# Patient Record
Sex: FEMALE | Race: WHITE
[De-identification: names, ages, dates, MRNs, and addresses within clinical notes are randomized per-mention and may not be internally consistent; named-entity substitution may affect disease eponyms.]

---

## 2017-01-16 ENCOUNTER — HOSPITAL ENCOUNTER (OUTPATIENT)
Dept: HOSPITAL 75 - ONC | Age: 67
LOS: 90 days | Discharge: HOME | End: 2017-04-16
Attending: INTERNAL MEDICINE
Payer: MEDICARE

## 2017-01-16 DIAGNOSIS — Z92.21: ICD-10-CM

## 2017-01-16 DIAGNOSIS — Z87.891: ICD-10-CM

## 2017-01-16 DIAGNOSIS — C32.1: Primary | ICD-10-CM

## 2017-01-16 DIAGNOSIS — Z92.3: ICD-10-CM

## 2017-01-16 LAB
ALBUMIN SERPL-MCNC: 4.1 G/DL (ref 3.2–4.5)
ALT SERPL-CCNC: 14 U/L (ref 0–55)
ANION GAP SERPL CALC-SCNC: 8 MMOL/L (ref 5–14)
AST SERPL-CCNC: 13 U/L (ref 5–34)
BASOPHILS # BLD AUTO: 0 10^3/UL (ref 0–0.1)
BASOPHILS NFR BLD AUTO: 0 % (ref 0–10)
BILIRUB SERPL-MCNC: 0.4 MG/DL (ref 0.1–1)
BUN SERPL-MCNC: 14 MG/DL (ref 7–18)
BUN/CREAT SERPL: 17
CALCIUM SERPL-MCNC: 9.3 MG/DL (ref 8.5–10.1)
CHLORIDE SERPL-SCNC: 104 MMOL/L (ref 98–107)
CO2 SERPL-SCNC: 27 MMOL/L (ref 21–32)
CREAT SERPL-MCNC: 0.83 MG/DL (ref 0.6–1.3)
EOSINOPHIL # BLD AUTO: 0.1 10^3/UL (ref 0–0.3)
EOSINOPHIL NFR BLD AUTO: 2 % (ref 0–10)
ERYTHROCYTE [DISTWIDTH] IN BLOOD BY AUTOMATED COUNT: 13.9 % (ref 10–14.5)
GFR SERPLBLD BASED ON 1.73 SQ M-ARVRAT: > 60 ML/MIN
GLUCOSE SERPL-MCNC: 121 MG/DL (ref 70–105)
LYMPHOCYTES # BLD AUTO: 1.2 X 10^3 (ref 1–4)
LYMPHOCYTES NFR BLD AUTO: 19 % (ref 12–44)
MCH RBC QN AUTO: 30 PG (ref 25–34)
MCHC RBC AUTO-ENTMCNC: 33 G/DL (ref 32–36)
MCV RBC AUTO: 91 FL (ref 80–99)
MONOCYTES # BLD AUTO: 0.6 X 10^3 (ref 0–1)
MONOCYTES NFR BLD AUTO: 9 % (ref 0–12)
NEUTROPHILS # BLD AUTO: 4.4 X 10^3 (ref 1.8–7.8)
NEUTROPHILS NFR BLD AUTO: 70 % (ref 42–75)
PLATELET # BLD: 228 10^3/UL (ref 130–400)
PMV BLD AUTO: 9 FL (ref 7.4–10.4)
POTASSIUM SERPL-SCNC: 4.5 MMOL/L (ref 3.6–5)
PROT SERPL-MCNC: 7 G/DL (ref 6.4–8.2)
RBC # BLD AUTO: 4.25 10^6/UL (ref 4.35–5.85)
SODIUM SERPL-SCNC: 139 MMOL/L (ref 135–145)
TSH SERPL-ACNC: 2.4 UIU/ML (ref 0.35–4.94)
WBC # BLD AUTO: 6.2 10^3/UL (ref 4.3–11)

## 2017-01-16 PROCEDURE — 85025 COMPLETE CBC W/AUTO DIFF WBC: CPT

## 2017-01-16 PROCEDURE — 99213 OFFICE O/P EST LOW 20 MIN: CPT

## 2017-01-16 PROCEDURE — 36415 COLL VENOUS BLD VENIPUNCTURE: CPT

## 2017-01-16 PROCEDURE — 80053 COMPREHEN METABOLIC PANEL: CPT

## 2017-01-16 PROCEDURE — 84443 ASSAY THYROID STIM HORMONE: CPT

## 2017-01-16 NOTE — XMS REPORT
Continuity of Care Document

 Created on: 2014



YARITZA HERZOG

External Reference #: [id number]

: 1950

Sex: Female



Demographics







 Address  [address]

[place]  [address]

 

 Home Phone  [phone]

 

 Preferred Language  English

 

 Marital Status  Unknown

 

 Rastafari Affiliation  Unknown

 

 Race  Unknown

 

 Ethnic Group  Unknown





Author







 Author  [facility]

 

 Organization  [facility]

 

 Address  Unknown

 

 Phone  Unavailable







Support







 Name  Relationship  Address  Phone

 

 BREANA AMARAL MD  Caregiver  [address], [address]

[place]  [address] [phone]

 

 SHARON STEVENS MD  Caregiver  [address] [place]  [address] [phone]

 

 MANDO MARIE  Caregiver  [address] [place], [place]  [address] [phone]

 

 LORENA [place]  [address] [phone]







Care Team Providers







 Care Team Member Name  Role  Phone

 

 BREANA AMARAL MD  PCP  [phone]







Insurance Providers







 Payer Name  Policy Number  Subscriber Name  Relationship

 

 Self Pay     [employer]  18 Self / Same As Patient







Advance Directives







 Directive  Response  Recorded Date/Time

 

 Advance Directives  Yes  14 8:30am

 

 Health Care Power of   No  14 8:30am

 

 Organ Donor  Yes  14 8:30am

 

 Resuscitation Status  Full Code  14 8:30am







Problems

Medical Problems





 Problem  Onset Date  Status

 

 Fecal impaction  Unknown  Active







Medications







 Medication  Dose  Route  Sig  Days/Qty  Instructions  Order Date  Discontinued 
Date  Status

 

 HCTZ/Lisinopril (Zestoretic)  1 Each  PO  DAILY        14     Active

 

 Tramadol Hcl  50 Mg  PO  THREE TIMES A DAY For Pain  30 Qty     10/02/14  11/14
/14  Discontinued

 

 Acetaminophen  325 Mg  PO  AS NEEDED        14     Active

 

 Potassium Chloride (Micro K)  1 Each  PO  DAILY WITH FOOD        14     
Active

 

 Ondansetron Hcl  8 Mg  PO  EVERY 8HRS PRN NAUSEA/VOMITING        14     
Active

 

 Omeprazole  20 Mg  GT  DAILY        14     Active

 

 Docusate Sodium  100 Mg  PO  TWICE A DAY        14     Active

 

 Polyethylene Glycol  17 Gm  GT           14     Active

 

 [Gi Cocktail]        AS DIRECTED        14     Active

 

 Pantoprazole Sodium  40 Mg  PO  DAILY  90 Qty     14     Active







Social History







 Social History Problem  Response  Recorded Date/Time

 

 Alcohol Use  Rarely Uses  2014 5:12am

 

 Recreational Drug Use  No  2014 5:12am

 

 Recent Foreign Travel  No  2014 8:20am

 

 Smoking Status  Former Smoker  2014 8:15am

 

 Do you dip or chew tobacco?  No  2014 8:15am









 Query  Response  Start Date  Stop Date

 

 Smoking Status  Former Smoker     09/10/2014







Hospital Discharge Instructions

No hospital discharge instructions.



Plan of Care

No plan of care.



Functional Status

No functional status results.



Allergies, Adverse Reactions, Alerts







 Allergen  Type  Severity  Reaction  Status  Last Updated

 

 Codeine  Allergy  Unknown  NAUSEA  Active  14







Immunizations

No immunization records.



Vital Signs

Acute Vital Signs





 Vital  Response  Date/Time

 

 Temperature (Fahrenheit)  97.3 degrees F (97.6 - 99.5)   

 

 Temperature (Calculated Celsius)  36.59158 degrees C (36.4 - 37.5)   

 

 Temperature Source  Tympanic     

 

 Pulse Rate (adult)  81 bpm (60 - 90)   

 

 Respiratory Rate  16 bpm (12 - 24)   

 

 O2 Sat by Pulse Oximetry  98 % (88 - 100)   

 

 Blood Pressure  124/69 mm Hg   

 

 Pain      

 

    Pain Intensity  5     

 

 Height (Feet)  5 feet    

 

 Height (Inches)  6.00 inches    

 

 Height (Calculated Centimeters)  167.917173 cm    

 

 Weight (Pounds)  171 pounds    

 

 Weight (Calculated Grams)  58028.296 gm    

 

 Weight (Calculated Kilograms)  77.905235 kilograms    

 

 Calculated BMI  21.09     







Results

Laboratory Results







 Test Name  Result  Units  Flags  Reference  Collection Date/Time  Result Date/
Time  Comments

 

 White Blood Count  9.0  10^3/uL     4.3-11.0  2014 2:41pm  2014 2:
49pm   

 

 Red Blood Count  4.01  10^6/uL  L  4.35-5.85  2014 2:41pm  2014 2:
49pm   

 

 Hemoglobin  12.8  G/DL     11.5-16.0  2014 2:41pm  2014 2:49pm   

 

 Hematocrit  38  %     35-52  2014 2:41pm  2014 2:49pm   

 

 Mean Corpuscular Volume  94  FL     80-99  2014 2:41pm  2014 2:
49pm   

 

 Mean Corpuscular Hemoglobin  32  PG     25-34  2014 2:41pm  2014 2:
49pm   

 

 Mean Corpuscular Hemoglobin Concent  34  G/DL     32-36  2014 2:41pm   2:49pm   

 

 Red Cell Distribution Width  13.2  %     10.0-14.5  2014 2:41pm  2014 2:49pm   

 

 Platelet Count  186  10^3/uL     130-400  2014 2:41pm  2014 2:49pm
   

 

 Mean Platelet Volume  9.3  FL     7.4-10.4  2014 2:41pm  2014 2:
49pm   

 

 Neutrophils (%) (Auto)  74  %     42-75  2014 2:41pm  2014 2:49pm 
  

 

 Lymphocytes (%) (Auto)  18  %     12-44  2014 2:41pm  2014 2:49pm 
  

 

 Monocytes (%) (Auto)  7  %     0-12  2014 2:41pm  2014 2:49pm   

 

 Eosinophils (%) (Auto)  1  %     0-10  2014 2:41pm  2014 2:49pm   

 

 Basophils (%) (Auto)  0  %     0-10  2014 2:41pm  2014 2:49pm   

 

 Neutrophils # (Auto)  6.7  X 10^3     1.8-7.8  2014 2:41pm  2014 2:
49pm   

 

 Lymphocytes # (Auto)  1.6  X 10^3     1.0-4.0  2014 2:41pm  2014 2:
49pm   

 

 Monocytes # (Auto)  0.6  X 10^3     0.0-1.0  2014 2:41pm  2014 2:
49pm   

 

 Eosinophils # (Auto)  0.1  10^3/uL     0.0-0.3  2014 2:41pm  2014 2
:49pm   

 

 Basophils # (Auto)  0.0  10^3/uL     0.0-0.1  2014 2:41pm  2014 2:
49pm   

 

 Sodium Level  137  MMOL/L     135-145  2014 2:41pm  2014 3:10pm   

 

 Potassium Level  3.9  MMOL/L     3.6-5.0  2014 2:41pm  2014 3:10pm
   

 

 Chloride Level  102  MMOL/L       2014 2:41pm  2014 3:10pm   

 

 Carbon Dioxide Level  23  MMOL/L     21-32  2014 2:41pm  2014 3:
10pm   

 

 Blood Urea Nitrogen  17  MG/DL     7-18  2014 2:41pm  2014 3:10pm 
  

 

 Creatinine  0.75  MG/DL     0.60-1.30  2014 2:41pm  2014 3:10pm   

 

 BUN/Creatinine Ratio  23           2014 2:41pm  2014 3:10pm   

 

 Estimat Glomerular Filtration Rate  > 60           2014 2:41pm  2014 3:10pm                    GFR INTERPRETIVE DATA



         UNITS FOR ESTIMATED GFR (eGFR): mL/min/1.73 M2

         REFERENCE RANGE FOR ESTIMATED GFR (eGFR)

                                          eGFR

         NORMAL eGFR                       >60

         MODERATELY DECREASED eGFR          30-59

         SEVERLY DECREASED eGFR             15-29

         KIDNEY FAILURE                    <15 (OR DIALYSIS)

 

 Glucose Level  143  MG/DL  H    2014 2:41pm  2014 3:10pm   

 

 Calcium Level  8.9  MG/DL     8.5-10.1  2014 2:41pm  2014 3:10pm   

 

 Magnesium Level  2.1  MG/DL     1.8-2.4  2014 2:41pm  2014 3:10pm 
  

 

 Total Bilirubin  0.2  MG/DL     0.1-1.0  2014 2:41pm  2014 3:10pm 
  

 

 Alkaline Phosphatase  84  U/L       2014 2:41pm  2014 3:10pm
   

 

 Aspartate Amino Transf (AST/SGOT)  11  U/L     5-34  2014 2:41pm  2014 3:10pm   

 

 Alanine Aminotransferase (ALT/SGPT)  17  U/L     0-55  2014 2:41pm   3:10pm   

 

 Total Protein  7.2  G/DL     6.4-8.2  2014 2:41pm  2014 3:10pm   

 

 Albumin  4.1  G/DL     3.2-4.5  2014 2:41pm  2014 3:10pm   

 

 White Blood Count  8.8  10^3/uL     4.3-11.0  10/22/2014 12:10pm  10/22/2014 12
:21pm   

 

 Red Blood Count  4.48  10^6/uL     4.35-5.85  10/22/2014 12:10pm  10/22/2014 12
:21pm   

 

 Hemoglobin  14.0  G/DL     11.5-16.0  10/22/2014 12:10pm  10/22/2014 12:21pm   

 

 Hematocrit  42  %     35-52  10/22/2014 12:10pm  10/22/2014 12:21pm   

 

 Mean Corpuscular Volume  94  FL     80-99  10/22/2014 12:10pm  10/22/2014 12:
21pm   

 

 Mean Corpuscular Hemoglobin  31  PG     25-34  10/22/2014 12:10pm  10/22/2014 
12:21pm   

 

 Mean Corpuscular Hemoglobin Concent  33  G/DL     32-36  10/22/2014 12:10pm  10
/ 12:21pm   

 

 Red Cell Distribution Width  13.2  %     10.0-14.5  10/22/2014 12:10pm  10/22/
2014 12:21pm   

 

 Platelet Count  236  10^3/uL     130-400  10/22/2014 12:10pm  10/22/2014 12:
21pm   

 

 Mean Platelet Volume  9.6  FL     7.4-10.4  10/22/2014 12:10pm  10/22/2014 12:
21pm   

 

 Neutrophils (%) (Auto)  61  %     42-75  10/22/2014 12:10pm  10/22/2014 12:
21pm   

 

 Lymphocytes (%) (Auto)  30  %     12-44  10/22/2014 12:10pm  10/22/2014 12:
21pm   

 

 Monocytes (%) (Auto)  7  %     0-12  10/22/2014 12:10pm  10/22/2014 12:21pm   

 

 Eosinophils (%) (Auto)  1  %     0-10  10/22/2014 12:10pm  10/22/2014 12:21pm 
  

 

 Basophils (%) (Auto)  1  %     0-10  10/22/2014 12:10pm  10/22/2014 12:21pm   

 

 Neutrophils # (Auto)  5.4  X 10^3     1.8-7.8  10/22/2014 12:10pm  10/22/2014 
12:21pm   

 

 Lymphocytes # (Auto)  2.7  X 10^3     1.0-4.0  10/22/2014 12:10pm  10/22/2014 
12:21pm   

 

 Monocytes # (Auto)  0.6  X 10^3     0.0-1.0  10/22/2014 12:10pm  10/22/2014 12:
21pm   

 

 Eosinophils # (Auto)  0.1  10^3/uL     0.0-0.3  10/22/2014 12:10pm  10/22/2014 
12:21pm   

 

 Basophils # (Auto)  0.1  10^3/uL     0.0-0.1  10/22/2014 12:10pm  10/22/2014 12
:21pm   

 

 Prothrombin Time  12.9  SEC     12.2-14.7  10/22/2014 12:10pm  10/22/2014 1:
02pm   

 

 INR Comment  1.0        0.8-1.4  10/22/2014 12:10pm  10/22/2014 1:02pm        
               INTERPRETIVE DATA

SUGGESTED THERAPEUTIC RANGE FOR INR'S:

   VENOUS THROMBOSIS, PULMONARY EMBOLISM, OR PREVENTION OF

   SYSTEMIC EMBOLISM (EG. IN ATRIAL FIBRILLATION):

                     2.0  -  3.0

   MECHANICAL PROSTHETIC HEART VALVES:

                     2.5  -  3.5*

*NOTE:  INR'S UP TO 4.5 MAY BE NECESSARY IN SELECTED GROUPS 

        OF HIGH RISK PATIENTS.

[employer] CONSENSUS

CONFERENCE ON ANTITHROMBOTIC THERAPY (2000).

 

 Activated Partial Thromboplast Time  26  SEC     24-35  10/22/2014 12:10pm  10/
 1:02pm   

 

 White Blood Count  9.3  10^3/uL     4.3-11.0  10/31/2014 1:49pm  10/31/2014 1:
58pm   

 

 Red Blood Count  4.41  10^6/uL     4.35-5.85  10/31/2014 1:49pm  10/31/2014 1:
58pm   

 

 Hemoglobin  14.0  G/DL     11.5-16.0  10/31/2014 1:49pm  10/31/2014 1:58pm   

 

 Hematocrit  41  %     35-52  10/31/2014 1:49pm  10/31/2014 1:58pm   

 

 Mean Corpuscular Volume  93  FL     80-99  10/31/2014 1:49pm  10/31/2014 1:
58pm   

 

 Mean Corpuscular Hemoglobin  32  PG     25-34  10/31/2014 1:49pm  10/31/2014 1:
58pm   

 

 Mean Corpuscular Hemoglobin Concent  34  G/DL     32-36  10/31/2014 1:49pm  10/
 1:58pm   

 

 Red Cell Distribution Width  12.9  %     10.0-14.5  10/31/2014 1:49pm  10/31/
2014 1:58pm   

 

 Platelet Count  230  10^3/uL     130-400  10/31/2014 1:49pm  10/31/2014 1:58pm
   

 

 Mean Platelet Volume  9.4  FL     7.4-10.4  10/31/2014 1:49pm  10/31/2014 1:
58pm   

 

 Neutrophils (%) (Auto)  67  %     42-75  10/31/2014 1:49pm  10/31/2014 1:58pm 
  

 

 Lymphocytes (%) (Auto)  25  %     12-44  10/31/2014 1:49pm  10/31/2014 1:58pm 
  

 

 Monocytes (%) (Auto)  7  %     0-12  10/31/2014 1:49pm  10/31/2014 1:58pm   

 

 Eosinophils (%) (Auto)  1  %     0-10  10/31/2014 1:49pm  10/31/2014 1:58pm   

 

 Basophils (%) (Auto)  0  %     0-10  10/31/2014 1:49pm  10/31/2014 1:58pm   

 

 Neutrophils # (Auto)  6.3  X 10^3     1.8-7.8  10/31/2014 1:49pm  10/31/2014 1:
58pm   

 

 Lymphocytes # (Auto)  2.3  X 10^3     1.0-4.0  10/31/2014 1:49pm  10/31/2014 1:
58pm   

 

 Monocytes # (Auto)  0.7  X 10^3     0.0-1.0  10/31/2014 1:49pm  10/31/2014 1:
58pm   

 

 Eosinophils # (Auto)  0.1  10^3/uL     0.0-0.3  10/31/2014 1:49pm  10/31/2014 1
:58pm   

 

 Basophils # (Auto)  0.0  10^3/uL     0.0-0.1  10/31/2014 1:49pm  10/31/2014 1:
58pm   

 

 Sodium Level  140  MMOL/L     135-145  10/31/2014 1:49pm  10/31/2014 2:15pm   

 

 Potassium Level  3.8  MMOL/L     3.6-5.0  10/31/2014 1:49pm  10/31/2014 2:15pm
   

 

 Chloride Level  105  MMOL/L       10/31/2014 1:49pm  10/31/2014 2:15pm   

 

 Carbon Dioxide Level  22  MMOL/L     21-32  10/31/2014 1:49pm  10/31/2014 2:
15pm   

 

 Blood Urea Nitrogen  13  MG/DL     7-18  10/31/2014 1:49pm  10/31/2014 2:15pm 
  

 

 Creatinine  0.70  MG/DL     0.60-1.30  10/31/2014 1:49pm  10/31/2014 2:15pm   

 

 BUN/Creatinine Ratio  19           10/31/2014 1:49pm  10/31/2014 2:15pm   

 

 Estimat Glomerular Filtration Rate  > 60           10/31/2014 1:49pm  10/31/
2014 2:15pm                    GFR INTERPRETIVE DATA



         UNITS FOR ESTIMATED GFR (eGFR): mL/min/1.73 M2

         REFERENCE RANGE FOR ESTIMATED GFR (eGFR)

                                          eGFR

         NORMAL eGFR                       >60

         MODERATELY DECREASED eGFR          30-59

         SEVERLY DECREASED eGFR             15-29

         KIDNEY FAILURE                    <15 (OR DIALYSIS)

 

 Glucose Level  139  MG/DL  H    10/31/2014 1:49pm  10/31/2014 2:15pm   

 

 Calcium Level  9.3  MG/DL     8.5-10.1  10/31/2014 1:49pm  10/31/2014 2:15pm   

 

 Total Bilirubin  0.3  MG/DL     0.1-1.0  10/31/2014 1:49pm  10/31/2014 2:15pm 
  

 

 Alkaline Phosphatase  86  U/L       10/31/2014 1:49pm  10/31/2014 2:15pm
   

 

 Aspartate Amino Transf (AST/SGOT)  19  U/L     5-34  10/31/2014 1:49pm  10/31/
2014 2:15pm   

 

 Alanine Aminotransferase (ALT/SGPT)  19  U/L     0-55  10/31/2014 1:49pm  10/31
/2014 2:15pm   

 

 Total Protein  7.7  G/DL     6.4-8.2  10/31/2014 1:49pm  10/31/2014 2:15pm   

 

 Albumin  4.4  G/DL     3.2-4.5  10/31/2014 1:49pm  10/31/2014 2:15pm   

 

 White Blood Count  8.7  10^3/uL     4.3-11.0  2014 5:152014 5:
37am   

 

 Red Blood Count  4.46  10^6/uL     4.35-5.85  2014 5:152014 5:
37am   

 

 Hemoglobin  14.3  G/DL     11.5-16.0  2014 5:152014 5:37am   

 

 Hematocrit  42  %     35-52  2014 5:152014 5:37am   

 

 Mean Corpuscular Volume  94  FL     80-99  2014 5:2014 5:
37am   

 

 Mean Corpuscular Hemoglobin  32  PG     25-34  2014 5:152014 5:
37am   

 

 Mean Corpuscular Hemoglobin Concent  34  G/DL     32-36  2014 5:15 5:37am   

 

 Red Cell Distribution Width  13.1  %     10.0-14.5  2014 5:152014 5:37am   

 

 Platelet Count  259  10^3/uL     130-400  2014 5:152014 5:37am
   

 

 Mean Platelet Volume  9.5  FL     7.4-10.4  2014 5:152014 5:
37am   

 

 Neutrophils (%) (Auto)  68  %     42-75  2014 5:152014 5:37am 
  

 

 Lymphocytes (%) (Auto)  21  %     12-44  2014 5:152014 5:37am 
  

 

 Monocytes (%) (Auto)  10  %     0-12  2014 5:152014 5:37am   

 

 Eosinophils (%) (Auto)  1  %     0-10  2014 5:2014 5:37am   

 

 Basophils (%) (Auto)  0  %     0-10  2014 5:2014 5:37am   

 

 Neutrophils # (Auto)  5.9  X 10^3     1.8-7.8  2014 5:2014 5:
37am   

 

 Lymphocytes # (Auto)  1.9  X 10^3     1.0-4.0  2014 5:2014 5:
37am   

 

 Monocytes # (Auto)  0.9  X 10^3     0.0-1.0  2014 5:2014 5:
37am   

 

 Eosinophils # (Auto)  0.0  10^3/uL     0.0-0.3  2014 5:2014 5
:37am   

 

 Basophils # (Auto)  0.0  10^3/uL     0.0-0.1  2014 5:2014 5:
37am   

 

 Sodium Level  138  MMOL/L     135-145  2014 5:2014 5:43am   

 

 Potassium Level  4.0  MMOL/L     3.6-5.0  2014 5:2014 5:43am
   

 

 Chloride Level  99  MMOL/L       2014 5:2014 5:43am   

 

 Carbon Dioxide Level  24  MMOL/L     21-32  2014 5:2014 5:
43am   

 

 Blood Urea Nitrogen  22  MG/DL  H  7-18  2014 5:2014 5:43am 
  

 

 Creatinine  0.79  MG/DL     0.60-1.30  2014 5:2014 5:43am   

 

 BUN/Creatinine Ratio  28           2014 5:152014 5:43am   

 

 Estimat Glomerular Filtration Rate  > 60           2014 5:152014 5:43am                    GFR INTERPRETIVE DATA



         UNITS FOR ESTIMATED GFR (eGFR): mL/min/1.73 M2

         REFERENCE RANGE FOR ESTIMATED GFR (eGFR)

                                          eGFR

         NORMAL eGFR                       >60

         MODERATELY DECREASED eGFR          30-59

         SEVERLY DECREASED eGFR             15-29

         KIDNEY FAILURE                    <15 (OR DIALYSIS)

 

 Glucose Level  132  MG/DL  H    2014 5:15am  2014 5:43am   

 

 Calcium Level  9.4  MG/DL     8.5-10.1  2014 5:15am  2014 5:43am   

 

 Magnesium Level  2.4  MG/DL     1.8-2.4  2014 5:15am  2014 5:43am 
  







Procedures







 Procedure  Status  Date  Provider(s)

 

 BIOPSY OF SALIVARY GLAND  completed  10/22/14  JERRELL COMER MD

 

 Esophagogastroduodenoscopy (EGD) with placement of percutaneous endoscopic 
gastrostomy (PEG)  completed  14  SHARON STEVENS MD







Encounters







 Encounter  Location  Date/Time

 

 Registered [facility] Day Care  [facility]  14 8:08am

 

 Registered Recurring  [facility]  14 7:40am

 

 Registered Clinic  [facility]  14 7:25am

 

 Departed Emergency Room  [facility]  14 5:09am

 

 Registered Clinic  [facility]  10/31/14 1:24pm

 

 Registered Surgical Day Care  [facility]  10/22/14 10:55am

## 2017-01-31 ENCOUNTER — HOSPITAL ENCOUNTER (EMERGENCY)
Dept: HOSPITAL 75 - ER | Age: 67
Discharge: HOME | End: 2017-01-31
Payer: MEDICARE

## 2017-01-31 VITALS — DIASTOLIC BLOOD PRESSURE: 68 MMHG | SYSTOLIC BLOOD PRESSURE: 153 MMHG

## 2017-01-31 VITALS — BODY MASS INDEX: 25.58 KG/M2 | WEIGHT: 163 LBS | HEIGHT: 67 IN

## 2017-01-31 DIAGNOSIS — G47.00: ICD-10-CM

## 2017-01-31 DIAGNOSIS — Z79.82: ICD-10-CM

## 2017-01-31 DIAGNOSIS — R11.2: Primary | ICD-10-CM

## 2017-01-31 DIAGNOSIS — J02.9: ICD-10-CM

## 2017-01-31 DIAGNOSIS — Z79.899: ICD-10-CM

## 2017-01-31 DIAGNOSIS — Z79.02: ICD-10-CM

## 2017-01-31 LAB
ALBUMIN SERPL-MCNC: 4.4 G/DL (ref 3.2–4.5)
ALT SERPL-CCNC: 12 U/L (ref 0–55)
ANION GAP SERPL CALC-SCNC: 13 MMOL/L (ref 5–14)
AST SERPL-CCNC: 15 U/L (ref 5–34)
BASOPHILS # BLD AUTO: 0 10^3/UL (ref 0–0.1)
BASOPHILS NFR BLD AUTO: 0 % (ref 0–10)
BILIRUB SERPL-MCNC: 0.3 MG/DL (ref 0.1–1)
BILIRUB UR QL STRIP: NEGATIVE
BUN SERPL-MCNC: 19 MG/DL (ref 7–18)
BUN/CREAT SERPL: 22
CALCIUM SERPL-MCNC: 8.9 MG/DL (ref 8.5–10.1)
CHLORIDE SERPL-SCNC: 103 MMOL/L (ref 98–107)
CO2 SERPL-SCNC: 24 MMOL/L (ref 21–32)
CREAT SERPL-MCNC: 0.85 MG/DL (ref 0.6–1.3)
EOSINOPHIL # BLD AUTO: 0.1 10^3/UL (ref 0–0.3)
EOSINOPHIL NFR BLD AUTO: 2 % (ref 0–10)
ERYTHROCYTE [DISTWIDTH] IN BLOOD BY AUTOMATED COUNT: 13.7 % (ref 10–14.5)
GFR SERPLBLD BASED ON 1.73 SQ M-ARVRAT: > 60 ML/MIN
GLUCOSE SERPL-MCNC: 144 MG/DL (ref 70–105)
KETONES UR QL STRIP: NEGATIVE
LEUKOCYTE ESTERASE UR QL STRIP: NEGATIVE
LIPASE SERPL-CCNC: 52 U/L (ref 8–78)
LYMPHOCYTES # BLD AUTO: 1.5 X 10^3 (ref 1–4)
LYMPHOCYTES NFR BLD AUTO: 26 % (ref 12–44)
MAGNESIUM SERPL-MCNC: 2.4 MG/DL (ref 1.8–2.4)
MCH RBC QN AUTO: 31 PG (ref 25–34)
MCHC RBC AUTO-ENTMCNC: 34 G/DL (ref 32–36)
MCV RBC AUTO: 90 FL (ref 80–99)
MONOCYTES # BLD AUTO: 0.4 X 10^3 (ref 0–1)
MONOCYTES NFR BLD AUTO: 7 % (ref 0–12)
NEUTROPHILS # BLD AUTO: 3.9 X 10^3 (ref 1.8–7.8)
NEUTROPHILS NFR BLD AUTO: 65 % (ref 42–75)
NITRITE UR QL STRIP: NEGATIVE
PH UR STRIP: 8 [PH] (ref 5–9)
PLATELET # BLD: 216 10^3/UL (ref 130–400)
PMV BLD AUTO: 9.6 FL (ref 7.4–10.4)
POTASSIUM SERPL-SCNC: 4 MMOL/L (ref 3.6–5)
PROT SERPL-MCNC: 7.5 G/DL (ref 6.4–8.2)
PROT UR QL STRIP: (no result)
RBC # BLD AUTO: 4.39 10^6/UL (ref 4.35–5.85)
SODIUM SERPL-SCNC: 140 MMOL/L (ref 135–145)
SP GR UR STRIP: 1.01 (ref 1.02–1.02)
SQUAMOUS #/AREA URNS HPF: (no result) /HPF
TROPONIN I SERPL-MCNC: < 0.3 NG/ML (ref ?–0.3)
UROBILINOGEN UR-MCNC: NORMAL MG/DL
WBC # BLD AUTO: 5.9 10^3/UL (ref 4.3–11)
WBC #/AREA URNS HPF: (no result) /HPF

## 2017-01-31 PROCEDURE — 93005 ELECTROCARDIOGRAM TRACING: CPT

## 2017-01-31 PROCEDURE — 85025 COMPLETE CBC W/AUTO DIFF WBC: CPT

## 2017-01-31 PROCEDURE — 84484 ASSAY OF TROPONIN QUANT: CPT

## 2017-01-31 PROCEDURE — 36415 COLL VENOUS BLD VENIPUNCTURE: CPT

## 2017-01-31 PROCEDURE — 80053 COMPREHEN METABOLIC PANEL: CPT

## 2017-01-31 PROCEDURE — 96374 THER/PROPH/DIAG INJ IV PUSH: CPT

## 2017-01-31 PROCEDURE — 83690 ASSAY OF LIPASE: CPT

## 2017-01-31 PROCEDURE — 81000 URINALYSIS NONAUTO W/SCOPE: CPT

## 2017-01-31 PROCEDURE — 83735 ASSAY OF MAGNESIUM: CPT

## 2017-01-31 PROCEDURE — 71020: CPT

## 2017-01-31 PROCEDURE — 96375 TX/PRO/DX INJ NEW DRUG ADDON: CPT

## 2017-01-31 NOTE — ED CHEST PAIN
General


Chief Complaint:  Chest Pain


Stated Complaint:  CP,NAUSEA,VOMITING


Source:  patient, RN notes reviewed


Exam Limitations:  no limitations





History of Present Illness


Time seen by provider:  06:53


Initial Comments


Patient c/ multiple complaints @ this time.  I believe her primarily compliant 

@ this time is primarily sore throat and nausea.  She states the nausea caused 

her to not sleep well last PM.  Reportedly saw her PCP yesterday for the sore 

throat who per the patient felt nothing serious was going on.  (+) hx of RTX to 

oropharynx/throat.  States going to have all her teeth pulled secondary to 

damage to them from the RTX.  Apparently also had episode of chest pain on 

Saturday that radiated to her RUE, but it resolved then and hasn't returned.


Timing/Duration:  other (see above)


Severity/Quality:  moderate, burning


Location:  other (throat)


Radiation:  no radiation


Activities at Onset:  none, rest


Prior CP/Workup:  cardiolye scan, stress test


Modifying Factors:  worse with eating


ASA po PTA:  No


NTG SL PTA:  No


Associated Symptoms:   weakness (nausea)





Allergies and Home Medications


Allergies


Coded Allergies:  


     codeine (Unverified  Allergy, Unknown, NAUSEA, 9/30/14)





Home Medications


Aspirin 81 Mg Tab.chew 81 MG PO HS (Reported) 


Atorvastatin Calcium 10 Mg Tablet 10 MG PO HS (Reported) 


Clopidogrel Bisulfate 75 Mg Tablet 75 MG PO HS (Reported) 


Lidocaine 700 Mg Adh..patch 1 PATCH TP DAILY PRN PRN PAIN (Reported) 


Multivitamin/Iron/Folic Acid 1 Each Tablet 1 TAB PO Q48H (Reported) 


   TAKES AT BEDTIME 


Pantoprazole Sodium 40 Mg Tablet.dr 40 MG PO HS (Reported) 


Prochlorperazine Maleate 10 Mg Tablet #10 10 MG PO Q6H PRN PRN nausea 


   Prescribed by: SHWETA OZUNA on 1/31/17 0839





Review of Systems


Constitutional:   see HPINo fever


EENTM:   See HPI Throat Pain


Cardiovascular:   See HPI Chest Pain (on Saturday)


Gastrointestinal:   See HPI Nausea





All Other Systems Reviewed


Negative Unless Noted:  Yes (Negative excepted noted.)





Past Medical-Social-Family Hx


Patient Social History


Type Used:  Cigarettes


Former Smoker/When Quit:  Apr 20, 2014


Recent Foreign Travel:  No


Contact w/Someone Who Travel:  No





Immunizations Up To Date


Tetanus Booster (TDap):  Unknown


Date of Influenza Vaccine:  Sep 6, 2015





Surgeries


HX Surgeries:  No





Respiratory


Hx Respiratory Disorders:  No





Cardiovascular


Hx Cardiac Disorders:  Yes


Cardiac Disorders:  Hypertension





Neurological


Hx Neurological Disorders:  No





Genitourinary


Hx Genitourinary Disorders:  No





Gastrointestinal


Hx Gastrointestinal Disorders:  Yes


Gastrointestinal Disorders:  Gastroesophageal Reflux





Musculoskeletal


Hx Musculoskeletal Disorders:  No





Endocrine


Hx Endocrine Disorders:  No





HEENT


HX ENT Disorders:  Yes (upper dentures)





Cancer


Hx Cancer:  Yes (THROAT)





Blood Transfusions


Hx Blood Disorders:  No





Physical Exam


Vital Signs





 Vital Sign - Last 12Hours








 1/31/17





 06:52


 


Temp 98.4


 


Pulse 75


 


Resp 14


 


B/P 175/91


 


Pulse Ox 97


 


O2 Delivery Room Air





Capillary Refill :


General Appearance:   No Apparent Distress WD/WN


HEENT:   Normal ENT Inspection Pharynx Normal


Neck:   Normal Inspection


Respiratory:   Lungs Clear No Respiratory Distress


Cardiovascular:   Regular Rate, Rhythm


Gastrointestinal:  No Rebound,  Tenderness (mild epigastically)


Rectal:   Deferred


Neurologic/Psychiatric:   Oriented x3 No Motor/Sensory Deficits


Skin:   Warm/Dry





Progress/Results/Core Measures


Results/Orders


Lab Results





 Laboratory Tests








Test


  1/31/17


06:55 1/31/17


08:00 Range/Units


 


 


Alanine Aminotransferase


(ALT/SGPT) 12 


  


  0-55  U/L


 


 


Albumin 4.4   3.2-4.5  G/DL


 


Alkaline Phosphatase 113     U/L


 


Anion Gap 13   5-14  MMOL/L


 


Aspartate Amino Transf


(AST/SGOT) 15 


  


  5-34  U/L


 


 


BUN/Creatinine Ratio 22    


 


Basophils # (Auto)


  0.0 


  


  0.0-0.1


10^3/uL


 


Basophils (%) (Auto) 0   0-10  %


 


Blood Urea Nitrogen 19 H  7-18  MG/DL


 


Calcium Level 8.9   8.5-10.1  MG/DL


 


Carbon Dioxide Level 24   21-32  MMOL/L


 


Chloride Level 103     MMOL/L


 


Creatinine


  0.85 


  


  0.60-1.30


MG/DL


 


Eosinophils # (Auto)


  0.1 


  


  0.0-0.3


10^3/uL


 


Eosinophils (%) (Auto) 2   0-10  %


 


Estimat Glomerular Filtration


Rate > 60 


  


   


 


 


Glucose Level 144 H    MG/DL


 


Hematocrit 40   35-52  %


 


Hemoglobin 13.5   11.5-16.0  G/DL


 


Lipase 52   8-78  U/L


 


Lymphocytes # (Auto) 1.5   1.0-4.0  X 10^3


 


Lymphocytes (%) (Auto) 26   12-44  %


 


Magnesium Level 2.4   1.8-2.4  MG/DL


 


Mean Corpuscular Hemoglobin 31   25-34  PG


 


Mean Corpuscular Hemoglobin


Concent 34 


  


  32-36  G/DL


 


 


Mean Corpuscular Volume 90   80-99  FL


 


Mean Platelet Volume 9.6   7.4-10.4  FL


 


Monocytes # (Auto) 0.4   0.0-1.0  X 10^3


 


Monocytes (%) (Auto) 7   0-12  %


 


Neutrophils # (Auto) 3.9   1.8-7.8  X 10^3


 


Neutrophils (%) (Auto) 65   42-75  %


 


Platelet Count


  216 


  


  130-400


10^3/uL


 


Potassium Level 4.0   3.6-5.0  MMOL/L


 


Red Blood Count


  4.39 


  


  4.35-5.85


10^6/uL


 


Red Cell Distribution Width 13.7   10.0-14.5  %


 


Sodium Level 140   135-145  MMOL/L


 


Total Bilirubin 0.3   0.1-1.0  MG/DL


 


Total Protein 7.5   6.4-8.2  G/DL


 


Troponin I < 0.30   <0.30  NG/ML


 


White Blood Count


  5.9 


  


  4.3-11.0


10^3/uL


 


Urine Amorphous Sediment


  


  FEW VI


PHOSPHATE H  /LPF


 


 


Urine Bacteria  FEW H  /HPF


 


Urine Bilirubin  NEGATIVE  NEGATIVE  


 


Urine Casts  NONE   /LPF


 


Urine Clarity  CLEAR   


 


Urine Color  YELLOW   


 


Urine Crystals  PRESENT H  /LPF


 


Urine Culture Indicated  NO   


 


Urine Glucose (UA)  1+ H NEGATIVE  


 


Urine Ketones  NEGATIVE  NEGATIVE  


 


Urine Leukocyte Esterase  NEGATIVE  NEGATIVE  


 


Urine Mucus  NEGATIVE   /LPF


 


Urine Nitrite  NEGATIVE  NEGATIVE  


 


Urine Protein  1+ H NEGATIVE  


 


Urine RBC  2-5 H  /HPF


 


Urine RBC (Auto)  NEGATIVE  NEGATIVE  


 


Urine Specific Gravity  1.015 L 1.016-1.022  


 


Urine Squamous Epithelial


Cells 


  5-10 


   /HPF


 


 


Urine Urobilinogen  NORMAL  NORMAL  MG/DL


 


Urine WBC  NONE   /HPF


 


Urine pH  8  5-9  








My Orders





 Orders-SHWETA OZUNA DO


Saline Lock/Iv-Start (1/31/17 06:54)


Ekg Tracing (1/31/17 06:54)


Cbc With Automated Diff (1/31/17 07:20)


Comprehensive Metabolic Panel (1/31/17 07:20)


Lipase (1/31/17 07:20)


Magnesium (1/31/17 07:20)


Troponin I (1/31/17 07:20)


Ua Culture If Indicated (1/31/17 07:20)


Chest Pa/Lat (2 View) (1/31/17 07:20)


Ondansetron Injection (Zofran Injectio (1/31/17 07:30)


Famotidine Injection (Pepcid Injection) (1/31/17 07:30)





Medications Given in ED





 Current Medications








 Medications  Dose


 Ordered  Sig/Zachary


 Route  Start Time


 Stop Time Status Last Admin


Dose Admin


 


 Famotidine  20 mg  ONCE  ONCE


 IVP  1/31/17 07:30


 1/31/17 07:31 DC 1/31/17 07:42


20 MG


 


 Ondansetron HCl  4 mg  ONCE  ONCE


 IVP  1/31/17 07:30


 1/31/17 07:31 DC 1/31/17 07:41


4 MG








Vital Signs/I&O





 Vital Sign - Last 12Hours








 1/31/17 1/31/17 1/31/17





 06:52 07:01 09:27


 


Temp 98.4  


 


Pulse 75  78


 


Resp 14  18


 


B/P 175/91  


 


Pulse Ox 97  100


 


O2 Delivery Room Air Room Air 











ECG


Initial ECG Impression Date:  Jan 31, 2017


Initial ECG Impression Time:  06:55


Initial ECG Rate:  77


Initial ECG Rhythm:  Normal Sinus


Initial ECG Intervals:  Normal


Initial ECG Impression:  Normal


Initial ECG Comparisson:  No Previous ECG Available





Diagnostic Imaging





   Diagonstic Imaging:  Xray


   Plain Films/CT/US/NM/MRI:  chest (nothing acute)





Departure


Impression


Impression:  


 Primary Impression:  


 Pharyngitis


 Additional Impressions:  


 Nausea


 Insomnia


Disposition:  01 HOME, SELF-CARE


Condition:  Stable





Departure-Patient Inst.


Decision time for Depature:  08:36


Referrals:  


PRATIK HERNANDES MD (PCP/Family)


Primary Care Physician


Patient Instructions:  Insomnia (DC), Nausea and Vomiting With Cancer Treatment

, Sore Throat, Adult (DC)





Add. Discharge Instructions:


All discharge instructions reviewed with patient and/or family. Voiced 

understanding.  MIGHT TRY SOME BENADRYL 25-50 mg @ BEDTIME TO HELP WITH BOTH 

SLEEP AND NAUSEA.


Scripts


Prochlorperazine Maleate (Compazine)10 Mg Qstzar34 Mg PO Q6H PRN nausea #10 TAB

  Ref 0


   Prov:SHWETA OZUNA DO         1/31/17








SHWETA OZUNA DO Jan 31, 2017 06:53

## 2017-01-31 NOTE — DIAGNOSTIC IMAGING REPORT
INDICATION: Nausea and vomiting



PA and lateral chest obtained at 807 hours a.m. and compared to

9/28/16.



Heart and mediastinal silhouette are normal in appearance. The

lungs are clear. There is no pneumothorax or pleural fluid.



IMPRESSION:



Negative chest.



Dictated by:



Dictated on workstation # [id number]

## 2017-01-31 NOTE — XMS REPORT
Continuity of Care Document

 Created on: 2014



YARITZA HERZOG

External Reference #: [id number]

: 1950

Sex: Female



Demographics







 Address  [address]

[place]  [address]

 

 Home Phone  [phone]

 

 Preferred Language  English

 

 Marital Status  Unknown

 

 Judaism Affiliation  Unknown

 

 Race  Unknown

 

 Ethnic Group  Unknown





Author







 Author  [facility]

 

 Organization  [facility]

 

 Address  Unknown

 

 Phone  Unavailable







Support







 Name  Relationship  Address  Phone

 

 BREANA AMARAL MD  Caregiver  [address], [address]

[place]  [address] [phone]

 

 SHARON STEVENS MD  Caregiver  [address] [place] [place]  [address] [phone]

 

 MANDO MARIE  Caregiver  [address], [place]  [address] [phone]

 

 LORENA [place]NE[place]  [address] [phone]







Care Team Providers







 Care Team Member Name  Role  Phone

 

 BREANA AMARAL MD  PCP  [phone]







Insurance Providers







 Payer Name  Policy Number  Subscriber Name  Relationship

 

 Self Pay     [facility] Self / Same As Patient







Advance Directives







 Directive  Response  Recorded Date/Time

 

 Advance Directives  Yes  14 8:30am

 

 Health Care Power of   No  14 8:30am

 

 Organ Donor  Yes  14 8:30am

 

 Resuscitation Status  Full Code  14 8:30am







Problems

Medical Problems





 Problem  Onset Date  Status

 

 Fecal impaction  Unknown  Active







Medications







 Medication  Dose  Route  Sig  Days/Qty  Instructions  Order Date  Discontinued 
Date  Status

 

 HCTZ/Lisinopril (Zestoretic)  1 Each  PO  DAILY        14     Active

 

 Tramadol Hcl  50 Mg  PO  THREE TIMES A DAY For Pain  30 Qty     10/02/14  11/14
/14  Discontinued

 

 Acetaminophen  325 Mg  PO  AS NEEDED        14     Active

 

 Potassium Chloride (Micro K)  1 Each  PO  DAILY WITH FOOD        14     
Active

 

 Ondansetron Hcl  8 Mg  PO  EVERY 8HRS PRN NAUSEA/VOMITING        14     
Active

 

 Omeprazole  20 Mg  GT  DAILY        14     Active

 

 Docusate Sodium  100 Mg  PO  TWICE A DAY        14     Active

 

 Polyethylene Glycol  17 Gm  GT           14     Active

 

 [Gi Cocktail]        AS DIRECTED        14     Active

 

 Pantoprazole Sodium  40 Mg  PO  DAILY  90 Qty     14     Active







Social History







 Social History Problem  Response  Recorded Date/Time

 

 Alcohol Use  Rarely Uses  2014 5:12am

 

 Recreational Drug Use  No  2014 5:12am

 

 Recent Foreign Travel  No  2014 8:20am

 

 Smoking Status  Former Smoker  2014 8:15am

 

 Do you dip or chew tobacco?  No  2014 8:15am









 Query  Response  Start Date  Stop Date

 

 Smoking Status  Former Smoker     09/10/2014







Hospital Discharge Instructions

No hospital discharge instructions.



Plan of Care

No plan of care.



Functional Status

No functional status results.



Allergies, Adverse Reactions, Alerts







 Allergen  Type  Severity  Reaction  Status  Last Updated

 

 Codeine  Allergy  Unknown  NAUSEA  Active  14







Immunizations

No immunization records.



Vital Signs

Acute Vital Signs





 Vital  Response  Date/Time

 

 Temperature (Fahrenheit)  97.3 degrees F (97.6 - 99.5)   

 

 Temperature (Calculated Celsius)  36.26471 degrees C (36.4 - 37.5)   

 

 Temperature Source  Tympanic     

 

 Pulse Rate (adult)  81 bpm (60 - 90)   

 

 Respiratory Rate  16 bpm (12 - 24)   

 

 O2 Sat by Pulse Oximetry  98 % (88 - 100)   

 

 Blood Pressure  124/69 mm Hg   

 

 Pain      

 

    Pain Intensity  5     

 

 Height (Feet)  5 feet    

 

 Height (Inches)  6.00 inches    

 

 Height (Calculated Centimeters)  167.495533 cm    

 

 Weight (Pounds)  171 pounds    

 

 Weight (Calculated Grams)  61139.296 gm    

 

 Weight (Calculated Kilograms)  77.304130 kilograms    

 

 Calculated BMI  21.09     







Results

Laboratory Results







 Test Name  Result  Units  Flags  Reference  Collection Date/Time  Result Date/
Time  Comments

 

 White Blood Count  9.0  10^3/uL     4.3-11.0  2014 2:41pm  2014 2:
49pm   

 

 Red Blood Count  4.01  10^6/uL  L  4.35-5.85  2014 2:41pm  2014 2:
49pm   

 

 Hemoglobin  12.8  G/DL     11.5-16.0  2014 2:41pm  2014 2:49pm   

 

 Hematocrit  38  %     35-52  2014 2:41pm  2014 2:49pm   

 

 Mean Corpuscular Volume  94  FL     80-99  2014 2:41pm  2014 2:
49pm   

 

 Mean Corpuscular Hemoglobin  32  PG     25-34  2014 2:41pm  2014 2:
49pm   

 

 Mean Corpuscular Hemoglobin Concent  34  G/DL     32-36  2014 2:41pm   2:49pm   

 

 Red Cell Distribution Width  13.2  %     10.0-14.5  2014 2:41pm  2014 2:49pm   

 

 Platelet Count  186  10^3/uL     130-400  2014 2:41pm  2014 2:49pm
   

 

 Mean Platelet Volume  9.3  FL     7.4-10.4  2014 2:41pm  2014 2:
49pm   

 

 Neutrophils (%) (Auto)  74  %     42-75  2014 2:41pm  2014 2:49pm 
  

 

 Lymphocytes (%) (Auto)  18  %     12-44  2014 2:41pm  2014 2:49pm 
  

 

 Monocytes (%) (Auto)  7  %     0-12  2014 2:41pm  2014 2:49pm   

 

 Eosinophils (%) (Auto)  1  %     0-10  2014 2:41pm  2014 2:49pm   

 

 Basophils (%) (Auto)  0  %     0-10  2014 2:41pm  2014 2:49pm   

 

 Neutrophils # (Auto)  6.7  X 10^3     1.8-7.8  2014 2:41pm  2014 2:
49pm   

 

 Lymphocytes # (Auto)  1.6  X 10^3     1.0-4.0  2014 2:41pm  2014 2:
49pm   

 

 Monocytes # (Auto)  0.6  X 10^3     0.0-1.0  2014 2:41pm  2014 2:
49pm   

 

 Eosinophils # (Auto)  0.1  10^3/uL     0.0-0.3  2014 2:41pm  2014 2
:49pm   

 

 Basophils # (Auto)  0.0  10^3/uL     0.0-0.1  2014 2:41pm  2014 2:
49pm   

 

 Sodium Level  137  MMOL/L     135-145  2014 2:41pm  2014 3:10pm   

 

 Potassium Level  3.9  MMOL/L     3.6-5.0  2014 2:41pm  2014 3:10pm
   

 

 Chloride Level  102  MMOL/L       2014 2:41pm  2014 3:10pm   

 

 Carbon Dioxide Level  23  MMOL/L     21-32  2014 2:41pm  2014 3:
10pm   

 

 Blood Urea Nitrogen  17  MG/DL     7-18  2014 2:41pm  2014 3:10pm 
  

 

 Creatinine  0.75  MG/DL     0.60-1.30  2014 2:41pm  2014 3:10pm   

 

 BUN/Creatinine Ratio  23           2014 2:41pm  2014 3:10pm   

 

 Estimat Glomerular Filtration Rate  > 60           2014 2:41pm  2014 3:10pm                    GFR INTERPRETIVE DATA



         UNITS FOR ESTIMATED GFR (eGFR): mL/min/1.73 M2

         REFERENCE RANGE FOR ESTIMATED GFR (eGFR)

                                          eGFR

         NORMAL eGFR                       >60

         MODERATELY DECREASED eGFR          30-59

         SEVERLY DECREASED eGFR             15-29

         KIDNEY FAILURE                    <15 (OR DIALYSIS)

 

 Glucose Level  143  MG/DL  H    2014 2:41pm  2014 3:10pm   

 

 Calcium Level  8.9  MG/DL     8.5-10.1  2014 2:41pm  2014 3:10pm   

 

 Magnesium Level  2.1  MG/DL     1.8-2.4  2014 2:41pm  2014 3:10pm 
  

 

 Total Bilirubin  0.2  MG/DL     0.1-1.0  2014 2:41pm  2014 3:10pm 
  

 

 Alkaline Phosphatase  84  U/L       2014 2:41pm  2014 3:10pm
   

 

 Aspartate Amino Transf (AST/SGOT)  11  U/L     5-34  2014 2:41pm  2014 3:10pm   

 

 Alanine Aminotransferase (ALT/SGPT)  17  U/L     0-55  2014 2:41pm   3:10pm   

 

 Total Protein  7.2  G/DL     6.4-8.2  2014 2:41pm  2014 3:10pm   

 

 Albumin  4.1  G/DL     3.2-4.5  2014 2:41pm  2014 3:10pm   

 

 White Blood Count  8.8  10^3/uL     4.3-11.0  10/22/2014 12:10pm  10/22/2014 12
:21pm   

 

 Red Blood Count  4.48  10^6/uL     4.35-5.85  10/22/2014 12:10pm  10/22/2014 12
:21pm   

 

 Hemoglobin  14.0  G/DL     11.5-16.0  10/22/2014 12:10pm  10/22/2014 12:21pm   

 

 Hematocrit  42  %     35-52  10/22/2014 12:10pm  10/22/2014 12:21pm   

 

 Mean Corpuscular Volume  94  FL     80-99  10/22/2014 12:10pm  10/22/2014 12:
21pm   

 

 Mean Corpuscular Hemoglobin  31  PG     25-34  10/22/2014 12:10pm  10/22/2014 
12:21pm   

 

 Mean Corpuscular Hemoglobin Concent  33  G/DL     32-36  10/22/2014 12:10pm  10
/ 12:21pm   

 

 Red Cell Distribution Width  13.2  %     10.0-14.5  10/22/2014 12:10pm  10/22/
2014 12:21pm   

 

 Platelet Count  236  10^3/uL     130-400  10/22/2014 12:10pm  10/22/2014 12:
21pm   

 

 Mean Platelet Volume  9.6  FL     7.4-10.4  10/22/2014 12:10pm  10/22/2014 12:
21pm   

 

 Neutrophils (%) (Auto)  61  %     42-75  10/22/2014 12:10pm  10/22/2014 12:
21pm   

 

 Lymphocytes (%) (Auto)  30  %     12-44  10/22/2014 12:10pm  10/22/2014 12:
21pm   

 

 Monocytes (%) (Auto)  7  %     0-12  10/22/2014 12:10pm  10/22/2014 12:21pm   

 

 Eosinophils (%) (Auto)  1  %     0-10  10/22/2014 12:10pm  10/22/2014 12:21pm 
  

 

 Basophils (%) (Auto)  1  %     0-10  10/22/2014 12:10pm  10/22/2014 12:21pm   

 

 Neutrophils # (Auto)  5.4  X 10^3     1.8-7.8  10/22/2014 12:10pm  10/22/2014 
12:21pm   

 

 Lymphocytes # (Auto)  2.7  X 10^3     1.0-4.0  10/22/2014 12:10pm  10/22/2014 
12:21pm   

 

 Monocytes # (Auto)  0.6  X 10^3     0.0-1.0  10/22/2014 12:10pm  10/22/2014 12:
21pm   

 

 Eosinophils # (Auto)  0.1  10^3/uL     0.0-0.3  10/22/2014 12:10pm  10/22/2014 
12:21pm   

 

 Basophils # (Auto)  0.1  10^3/uL     0.0-0.1  10/22/2014 12:10pm  10/22/2014 12
:21pm   

 

 Prothrombin Time  12.9  SEC     12.2-14.7  10/22/2014 12:10pm  10/22/2014 1:
02pm   

 

 INR Comment  1.0        0.8-1.4  10/22/2014 12:10pm  10/22/2014 1:02pm        
               INTERPRETIVE DATA

SUGGESTED THERAPEUTIC RANGE FOR INR'S:

   VENOUS THROMBOSIS, PULMONARY EMBOLISM, OR PREVENTION OF

   SYSTEMIC EMBOLISM (EG. IN ATRIAL FIBRILLATION):

                     2.0  -  3.0

   MECHANICAL PROSTHETIC HEART VALVES:

                     2.5  -  3.5*

*NOTE:  INR'S UP TO 4.5 MAY BE NECESSARY IN SELECTED GROUPS 

        OF HIGH RISK PATIENTS.

[employer] CONSENSUS

CONFERENCE ON ANTITHROMBOTIC THERAPY (2000).

 

 Activated Partial Thromboplast Time  26  SEC     24-35  10/22/2014 12:10pm  10/
 1:02pm   

 

 White Blood Count  9.3  10^3/uL     4.3-11.0  10/31/2014 1:49pm  10/31/2014 1:
58pm   

 

 Red Blood Count  4.41  10^6/uL     4.35-5.85  10/31/2014 1:49pm  10/31/2014 1:
58pm   

 

 Hemoglobin  14.0  G/DL     11.5-16.0  10/31/2014 1:49pm  10/31/2014 1:58pm   

 

 Hematocrit  41  %     35-52  10/31/2014 1:49pm  10/31/2014 1:58pm   

 

 Mean Corpuscular Volume  93  FL     80-99  10/31/2014 1:49pm  10/31/2014 1:
58pm   

 

 Mean Corpuscular Hemoglobin  32  PG     25-34  10/31/2014 1:49pm  10/31/2014 1:
58pm   

 

 Mean Corpuscular Hemoglobin Concent  34  G/DL     32-36  10/31/2014 1:49pm  10/
 1:58pm   

 

 Red Cell Distribution Width  12.9  %     10.0-14.5  10/31/2014 1:49pm  10/31/
2014 1:58pm   

 

 Platelet Count  230  10^3/uL     130-400  10/31/2014 1:49pm  10/31/2014 1:58pm
   

 

 Mean Platelet Volume  9.4  FL     7.4-10.4  10/31/2014 1:49pm  10/31/2014 1:
58pm   

 

 Neutrophils (%) (Auto)  67  %     42-75  10/31/2014 1:49pm  10/31/2014 1:58pm 
  

 

 Lymphocytes (%) (Auto)  25  %     12-44  10/31/2014 1:49pm  10/31/2014 1:58pm 
  

 

 Monocytes (%) (Auto)  7  %     0-12  10/31/2014 1:49pm  10/31/2014 1:58pm   

 

 Eosinophils (%) (Auto)  1  %     0-10  10/31/2014 1:49pm  10/31/2014 1:58pm   

 

 Basophils (%) (Auto)  0  %     0-10  10/31/2014 1:49pm  10/31/2014 1:58pm   

 

 Neutrophils # (Auto)  6.3  X 10^3     1.8-7.8  10/31/2014 1:49pm  10/31/2014 1:
58pm   

 

 Lymphocytes # (Auto)  2.3  X 10^3     1.0-4.0  10/31/2014 1:49pm  10/31/2014 1:
58pm   

 

 Monocytes # (Auto)  0.7  X 10^3     0.0-1.0  10/31/2014 1:49pm  10/31/2014 1:
58pm   

 

 Eosinophils # (Auto)  0.1  10^3/uL     0.0-0.3  10/31/2014 1:49pm  10/31/2014 1
:58pm   

 

 Basophils # (Auto)  0.0  10^3/uL     0.0-0.1  10/31/2014 1:49pm  10/31/2014 1:
58pm   

 

 Sodium Level  140  MMOL/L     135-145  10/31/2014 1:49pm  10/31/2014 2:15pm   

 

 Potassium Level  3.8  MMOL/L     3.6-5.0  10/31/2014 1:49pm  10/31/2014 2:15pm
   

 

 Chloride Level  105  MMOL/L       10/31/2014 1:49pm  10/31/2014 2:15pm   

 

 Carbon Dioxide Level  22  MMOL/L     21-32  10/31/2014 1:49pm  10/31/2014 2:
15pm   

 

 Blood Urea Nitrogen  13  MG/DL     7-18  10/31/2014 1:49pm  10/31/2014 2:15pm 
  

 

 Creatinine  0.70  MG/DL     0.60-1.30  10/31/2014 1:49pm  10/31/2014 2:15pm   

 

 BUN/Creatinine Ratio  19           10/31/2014 1:49pm  10/31/2014 2:15pm   

 

 Estimat Glomerular Filtration Rate  > 60           10/31/2014 1:49pm  10/31/
2014 2:15pm                    GFR INTERPRETIVE DATA



         UNITS FOR ESTIMATED GFR (eGFR): mL/min/1.73 M2

         REFERENCE RANGE FOR ESTIMATED GFR (eGFR)

                                          eGFR

         NORMAL eGFR                       >60

         MODERATELY DECREASED eGFR          30-59

         SEVERLY DECREASED eGFR             15-29

         KIDNEY FAILURE                    <15 (OR DIALYSIS)

 

 Glucose Level  139  MG/DL  H    10/31/2014 1:49pm  10/31/2014 2:15pm   

 

 Calcium Level  9.3  MG/DL     8.5-10.1  10/31/2014 1:49pm  10/31/2014 2:15pm   

 

 Total Bilirubin  0.3  MG/DL     0.1-1.0  10/31/2014 1:49pm  10/31/2014 2:15pm 
  

 

 Alkaline Phosphatase  86  U/L       10/31/2014 1:49pm  10/31/2014 2:15pm
   

 

 Aspartate Amino Transf (AST/SGOT)  19  U/L     5-34  10/31/2014 1:49pm  10/31/
2014 2:15pm   

 

 Alanine Aminotransferase (ALT/SGPT)  19  U/L     0-55  10/31/2014 1:49pm  10/31
/2014 2:15pm   

 

 Total Protein  7.7  G/DL     6.4-8.2  10/31/2014 1:49pm  10/31/2014 2:15pm   

 

 Albumin  4.4  G/DL     3.2-4.5  10/31/2014 1:49pm  10/31/2014 2:15pm   

 

 White Blood Count  8.7  10^3/uL     4.3-11.0  2014 5:152014 5:
37am   

 

 Red Blood Count  4.46  10^6/uL     4.35-5.85  2014 5:152014 5:
37am   

 

 Hemoglobin  14.3  G/DL     11.5-16.0  2014 5:152014 5:37am   

 

 Hematocrit  42  %     35-52  2014 5:152014 5:37am   

 

 Mean Corpuscular Volume  94  FL     80-99  2014 5:2014 5:
37am   

 

 Mean Corpuscular Hemoglobin  32  PG     25-34  2014 5:152014 5:
37am   

 

 Mean Corpuscular Hemoglobin Concent  34  G/DL     32-36  2014 5:15 5:37am   

 

 Red Cell Distribution Width  13.1  %     10.0-14.5  2014 5:152014 5:37am   

 

 Platelet Count  259  10^3/uL     130-400  2014 5:152014 5:37am
   

 

 Mean Platelet Volume  9.5  FL     7.4-10.4  2014 5:152014 5:
37am   

 

 Neutrophils (%) (Auto)  68  %     42-75  2014 5:152014 5:37am 
  

 

 Lymphocytes (%) (Auto)  21  %     12-44  2014 5:152014 5:37am 
  

 

 Monocytes (%) (Auto)  10  %     0-12  2014 5:152014 5:37am   

 

 Eosinophils (%) (Auto)  1  %     0-10  2014 5:2014 5:37am   

 

 Basophils (%) (Auto)  0  %     0-10  2014 5:2014 5:37am   

 

 Neutrophils # (Auto)  5.9  X 10^3     1.8-7.8  2014 5:2014 5:
37am   

 

 Lymphocytes # (Auto)  1.9  X 10^3     1.0-4.0  2014 5:2014 5:
37am   

 

 Monocytes # (Auto)  0.9  X 10^3     0.0-1.0  2014 5:2014 5:
37am   

 

 Eosinophils # (Auto)  0.0  10^3/uL     0.0-0.3  2014 5:2014 5
:37am   

 

 Basophils # (Auto)  0.0  10^3/uL     0.0-0.1  2014 5:2014 5:
37am   

 

 Sodium Level  138  MMOL/L     135-145  2014 5:2014 5:43am   

 

 Potassium Level  4.0  MMOL/L     3.6-5.0  2014 5:2014 5:43am
   

 

 Chloride Level  99  MMOL/L       2014 5:2014 5:43am   

 

 Carbon Dioxide Level  24  MMOL/L     21-32  2014 5:2014 5:
43am   

 

 Blood Urea Nitrogen  22  MG/DL  H  7-18  2014 5:2014 5:43am 
  

 

 Creatinine  0.79  MG/DL     0.60-1.30  2014 5:2014 5:43am   

 

 BUN/Creatinine Ratio  28           2014 5:152014 5:43am   

 

 Estimat Glomerular Filtration Rate  > 60           2014 5:152014 5:43am                    GFR INTERPRETIVE DATA



         UNITS FOR ESTIMATED GFR (eGFR): mL/min/1.73 M2

         REFERENCE RANGE FOR ESTIMATED GFR (eGFR)

                                          eGFR

         NORMAL eGFR                       >60

         MODERATELY DECREASED eGFR          30-59

         SEVERLY DECREASED eGFR             15-29

         KIDNEY FAILURE                    <15 (OR DIALYSIS)

 

 Glucose Level  132  MG/DL  H    2014 5:15am  2014 5:43am   

 

 Calcium Level  9.4  MG/DL     8.5-10.1  2014 5:15am  2014 5:43am   

 

 Magnesium Level  2.4  MG/DL     1.8-2.4  2014 5:15am  2014 5:43am 
  







Procedures







 Procedure  Status  Date  Provider(s)

 

 BIOPSY OF SALIVARY GLAND  completed  10/22/14  JERRELL COMER MD

 

 Esophagogastroduodenoscopy (EGD) with placement of percutaneous endoscopic 
gastrostomy (PEG)  completed  14  SHARON STEVENS MD







Encounters







 Encounter  Location  Date/Time

 

 Registered Surgical Day Care  [facility]  14 8:08am

 

 Registered Recurring  [facility]  14 7:40am

 

 Registered Clinic  [facility]  14 7:25am

 

 Departed Emergency Room  [facility]  14 5:09am

 

 Registered Clinic  [facility]  10/31/14 1:24pm

 

 Registered Surgical Day Care  [facility]  10/22/14 10:55am

## 2017-02-10 ENCOUNTER — HOSPITAL ENCOUNTER (OUTPATIENT)
Dept: HOSPITAL 75 - LAB | Age: 67
End: 2017-02-10
Attending: PHYSICIAN ASSISTANT
Payer: MEDICARE

## 2017-02-10 DIAGNOSIS — E78.2: Primary | ICD-10-CM

## 2017-02-10 LAB
ALBUMIN SERPL-MCNC: 4.3 G/DL (ref 3.2–4.5)
ALT SERPL-CCNC: 13 U/L (ref 0–55)
AST SERPL-CCNC: 13 U/L (ref 5–34)
BILIRUB DIRECT SERPL-MCNC: 0.1 MG/DL (ref 0–0.3)
BILIRUB SERPL-MCNC: 0.4 MG/DL (ref 0.1–1)
CHOLEST SERPL-MCNC: 172 MG/DL (ref ?–200)
LDLC SERPL DIRECT ASSAY-MCNC: 112 MG/DL (ref 1–129)
PROT SERPL-MCNC: 7.2 G/DL (ref 6.4–8.2)
TRIGL SERPL-MCNC: 143 MG/DL (ref ?–150)
VLDLC SERPL CALC-MCNC: 29 MG/DL (ref 5–40)

## 2017-02-10 PROCEDURE — 80076 HEPATIC FUNCTION PANEL: CPT

## 2017-02-10 PROCEDURE — 36415 COLL VENOUS BLD VENIPUNCTURE: CPT

## 2017-02-10 PROCEDURE — 80061 LIPID PANEL: CPT

## 2017-02-10 NOTE — XMS REPORT
Continuity of Care Document

 Created on: 2014



YARITZA HERZOG

External Reference #: [id number]

: 1950

Sex: Female



Demographics







 Address  [address]

[place]  [address]

 

 Home Phone  [phone]

 

 Preferred Language  English

 

 Marital Status  Unknown

 

 Christian Affiliation  Unknown

 

 Race  Unknown

 

 Ethnic Group  Unknown





Author







 Author  [facility]

 

 Organization  [facility]

 

 Address  Unknown

 

 Phone  Unavailable







Support







 Name  Relationship  Address  Phone

 

 BREANA AMARAL MD  Caregiver  [address], [address]

[place]  [address] [phone]

 

 SHARON STEVENS MD  Caregiver  [address] [place]  [address] [phone]

 

 MANDO MARIE  Caregiver  [address] [place], [place]  [address] [phone]

 

 LORENA [place]NE[place]  [address] [phone]







Care Team Providers







 Care Team Member Name  Role  Phone

 

 BREANA AMARAL MD  PCP  [phone]







Insurance Providers







 Payer Name  Policy Number  Subscriber Name  Relationship

 

 Self Pay     [employer]  18 Self / Same As Patient







Advance Directives







 Directive  Response  Recorded Date/Time

 

 Advance Directives  Yes  14 8:30am

 

 Health Care Power of   No  14 8:30am

 

 Organ Donor  Yes  14 8:30am

 

 Resuscitation Status  Full Code  14 8:30am







Problems

Medical Problems





 Problem  Onset Date  Status

 

 Fecal impaction  Unknown  Active







Medications







 Medication  Dose  Route  Sig  Days/Qty  Instructions  Order Date  Discontinued 
Date  Status

 

 HCTZ/Lisinopril (Zestoretic)  1 Each  PO  DAILY        14     Active

 

 Tramadol Hcl  50 Mg  PO  THREE TIMES A DAY For Pain  30 Qty     10/02/14  11/14
/14  Discontinued

 

 Acetaminophen  325 Mg  PO  AS NEEDED        14     Active

 

 Potassium Chloride (Micro K)  1 Each  PO  DAILY WITH FOOD        14     
Active

 

 Ondansetron Hcl  8 Mg  PO  EVERY 8HRS PRN NAUSEA/VOMITING        14     
Active

 

 Omeprazole  20 Mg  GT  DAILY        14     Active

 

 Docusate Sodium  100 Mg  PO  TWICE A DAY        14     Active

 

 Polyethylene Glycol  17 Gm  GT           14     Active

 

 [Gi Cocktail]        AS DIRECTED        14     Active

 

 Pantoprazole Sodium  40 Mg  PO  DAILY  90 Qty     14     Active







Social History







 Social History Problem  Response  Recorded Date/Time

 

 Alcohol Use  Rarely Uses  2014 5:12am

 

 Recreational Drug Use  No  2014 5:12am

 

 Recent Foreign Travel  No  2014 8:20am

 

 Smoking Status  Former Smoker  2014 8:15am

 

 Do you dip or chew tobacco?  No  2014 8:15am









 Query  Response  Start Date  Stop Date

 

 Smoking Status  Former Smoker     09/10/2014







Hospital Discharge Instructions

No hospital discharge instructions.



Plan of Care

No plan of care.



Functional Status

No functional status results.



Allergies, Adverse Reactions, Alerts







 Allergen  Type  Severity  Reaction  Status  Last Updated

 

 Codeine  Allergy  Unknown  NAUSEA  Active  14







Immunizations

No immunization records.



Vital Signs

Acute Vital Signs





 Vital  Response  Date/Time

 

 Temperature (Fahrenheit)  97.3 degrees F (97.6 - 99.5)   

 

 Temperature (Calculated Celsius)  36.69044 degrees C (36.4 - 37.5)   

 

 Temperature Source  Tympanic     

 

 Pulse Rate (adult)  81 bpm (60 - 90)   

 

 Respiratory Rate  16 bpm (12 - 24)   

 

 O2 Sat by Pulse Oximetry  98 % (88 - 100)   

 

 Blood Pressure  124/69 mm Hg   

 

 Pain      

 

    Pain Intensity  5     

 

 Height (Feet)  5 feet    

 

 Height (Inches)  6.00 inches    

 

 Height (Calculated Centimeters)  167.072137 cm    

 

 Weight (Pounds)  171 pounds    

 

 Weight (Calculated Grams)  32854.296 gm    

 

 Weight (Calculated Kilograms)  77.923633 kilograms    

 

 Calculated BMI  21.09     







Results

Laboratory Results







 Test Name  Result  Units  Flags  Reference  Collection Date/Time  Result Date/
Time  Comments

 

 White Blood Count  9.0  10^3/uL     4.3-11.0  2014 2:41pm  2014 2:
49pm   

 

 Red Blood Count  4.01  10^6/uL  L  4.35-5.85  2014 2:41pm  2014 2:
49pm   

 

 Hemoglobin  12.8  G/DL     11.5-16.0  2014 2:41pm  2014 2:49pm   

 

 Hematocrit  38  %     35-52  2014 2:41pm  2014 2:49pm   

 

 Mean Corpuscular Volume  94  FL     80-99  2014 2:41pm  2014 2:
49pm   

 

 Mean Corpuscular Hemoglobin  32  PG     25-34  2014 2:41pm  2014 2:
49pm   

 

 Mean Corpuscular Hemoglobin Concent  34  G/DL     32-36  2014 2:41pm   2:49pm   

 

 Red Cell Distribution Width  13.2  %     10.0-14.5  2014 2:41pm  2014 2:49pm   

 

 Platelet Count  186  10^3/uL     130-400  2014 2:41pm  2014 2:49pm
   

 

 Mean Platelet Volume  9.3  FL     7.4-10.4  2014 2:41pm  2014 2:
49pm   

 

 Neutrophils (%) (Auto)  74  %     42-75  2014 2:41pm  2014 2:49pm 
  

 

 Lymphocytes (%) (Auto)  18  %     12-44  2014 2:41pm  2014 2:49pm 
  

 

 Monocytes (%) (Auto)  7  %     0-12  2014 2:41pm  2014 2:49pm   

 

 Eosinophils (%) (Auto)  1  %     0-10  2014 2:41pm  2014 2:49pm   

 

 Basophils (%) (Auto)  0  %     0-10  2014 2:41pm  2014 2:49pm   

 

 Neutrophils # (Auto)  6.7  X 10^3     1.8-7.8  2014 2:41pm  2014 2:
49pm   

 

 Lymphocytes # (Auto)  1.6  X 10^3     1.0-4.0  2014 2:41pm  2014 2:
49pm   

 

 Monocytes # (Auto)  0.6  X 10^3     0.0-1.0  2014 2:41pm  2014 2:
49pm   

 

 Eosinophils # (Auto)  0.1  10^3/uL     0.0-0.3  2014 2:41pm  2014 2
:49pm   

 

 Basophils # (Auto)  0.0  10^3/uL     0.0-0.1  2014 2:41pm  2014 2:
49pm   

 

 Sodium Level  137  MMOL/L     135-145  2014 2:41pm  2014 3:10pm   

 

 Potassium Level  3.9  MMOL/L     3.6-5.0  2014 2:41pm  2014 3:10pm
   

 

 Chloride Level  102  MMOL/L       2014 2:41pm  2014 3:10pm   

 

 Carbon Dioxide Level  23  MMOL/L     21-32  2014 2:41pm  2014 3:
10pm   

 

 Blood Urea Nitrogen  17  MG/DL     7-18  2014 2:41pm  2014 3:10pm 
  

 

 Creatinine  0.75  MG/DL     0.60-1.30  2014 2:41pm  2014 3:10pm   

 

 BUN/Creatinine Ratio  23           2014 2:41pm  2014 3:10pm   

 

 Estimat Glomerular Filtration Rate  > 60           2014 2:41pm  2014 3:10pm                    GFR INTERPRETIVE DATA



         UNITS FOR ESTIMATED GFR (eGFR): mL/min/1.73 M2

         REFERENCE RANGE FOR ESTIMATED GFR (eGFR)

                                          eGFR

         NORMAL eGFR                       >60

         MODERATELY DECREASED eGFR          30-59

         SEVERLY DECREASED eGFR             15-29

         KIDNEY FAILURE                    <15 (OR DIALYSIS)

 

 Glucose Level  143  MG/DL  H    2014 2:41pm  2014 3:10pm   

 

 Calcium Level  8.9  MG/DL     8.5-10.1  2014 2:41pm  2014 3:10pm   

 

 Magnesium Level  2.1  MG/DL     1.8-2.4  2014 2:41pm  2014 3:10pm 
  

 

 Total Bilirubin  0.2  MG/DL     0.1-1.0  2014 2:41pm  2014 3:10pm 
  

 

 Alkaline Phosphatase  84  U/L       2014 2:41pm  2014 3:10pm
   

 

 Aspartate Amino Transf (AST/SGOT)  11  U/L     5-34  2014 2:41pm  2014 3:10pm   

 

 Alanine Aminotransferase (ALT/SGPT)  17  U/L     0-55  2014 2:41pm   3:10pm   

 

 Total Protein  7.2  G/DL     6.4-8.2  2014 2:41pm  2014 3:10pm   

 

 Albumin  4.1  G/DL     3.2-4.5  2014 2:41pm  2014 3:10pm   

 

 White Blood Count  8.8  10^3/uL     4.3-11.0  10/22/2014 12:10pm  10/22/2014 12
:21pm   

 

 Red Blood Count  4.48  10^6/uL     4.35-5.85  10/22/2014 12:10pm  10/22/2014 12
:21pm   

 

 Hemoglobin  14.0  G/DL     11.5-16.0  10/22/2014 12:10pm  10/22/2014 12:21pm   

 

 Hematocrit  42  %     35-52  10/22/2014 12:10pm  10/22/2014 12:21pm   

 

 Mean Corpuscular Volume  94  FL     80-99  10/22/2014 12:10pm  10/22/2014 12:
21pm   

 

 Mean Corpuscular Hemoglobin  31  PG     25-34  10/22/2014 12:10pm  10/22/2014 
12:21pm   

 

 Mean Corpuscular Hemoglobin Concent  33  G/DL     32-36  10/22/2014 12:10pm  10
/ 12:21pm   

 

 Red Cell Distribution Width  13.2  %     10.0-14.5  10/22/2014 12:10pm  10/22/
2014 12:21pm   

 

 Platelet Count  236  10^3/uL     130-400  10/22/2014 12:10pm  10/22/2014 12:
21pm   

 

 Mean Platelet Volume  9.6  FL     7.4-10.4  10/22/2014 12:10pm  10/22/2014 12:
21pm   

 

 Neutrophils (%) (Auto)  61  %     42-75  10/22/2014 12:10pm  10/22/2014 12:
21pm   

 

 Lymphocytes (%) (Auto)  30  %     12-44  10/22/2014 12:10pm  10/22/2014 12:
21pm   

 

 Monocytes (%) (Auto)  7  %     0-12  10/22/2014 12:10pm  10/22/2014 12:21pm   

 

 Eosinophils (%) (Auto)  1  %     0-10  10/22/2014 12:10pm  10/22/2014 12:21pm 
  

 

 Basophils (%) (Auto)  1  %     0-10  10/22/2014 12:10pm  10/22/2014 12:21pm   

 

 Neutrophils # (Auto)  5.4  X 10^3     1.8-7.8  10/22/2014 12:10pm  10/22/2014 
12:21pm   

 

 Lymphocytes # (Auto)  2.7  X 10^3     1.0-4.0  10/22/2014 12:10pm  10/22/2014 
12:21pm   

 

 Monocytes # (Auto)  0.6  X 10^3     0.0-1.0  10/22/2014 12:10pm  10/22/2014 12:
21pm   

 

 Eosinophils # (Auto)  0.1  10^3/uL     0.0-0.3  10/22/2014 12:10pm  10/22/2014 
12:21pm   

 

 Basophils # (Auto)  0.1  10^3/uL     0.0-0.1  10/22/2014 12:10pm  10/22/2014 12
:21pm   

 

 Prothrombin Time  12.9  SEC     12.2-14.7  10/22/2014 12:10pm  10/22/2014 1:
02pm   

 

 INR Comment  1.0        0.8-1.4  10/22/2014 12:10pm  10/22/2014 1:02pm        
               INTERPRETIVE DATA

SUGGESTED THERAPEUTIC RANGE FOR INR'S:

   VENOUS THROMBOSIS, PULMONARY EMBOLISM, OR PREVENTION OF

   SYSTEMIC EMBOLISM (EG. IN ATRIAL FIBRILLATION):

                     2.0  -  3.0

   MECHANICAL PROSTHETIC HEART VALVES:

                     2.5  -  3.5*

*NOTE:  INR'S UP TO 4.5 MAY BE NECESSARY IN SELECTED GROUPS 

        OF HIGH RISK PATIENTS.

[employer] CONSENSUS

CONFERENCE ON ANTITHROMBOTIC THERAPY (2000).

 

 Activated Partial Thromboplast Time  26  SEC     24-35  10/22/2014 12:10pm  10/
 1:02pm   

 

 White Blood Count  9.3  10^3/uL     4.3-11.0  10/31/2014 1:49pm  10/31/2014 1:
58pm   

 

 Red Blood Count  4.41  10^6/uL     4.35-5.85  10/31/2014 1:49pm  10/31/2014 1:
58pm   

 

 Hemoglobin  14.0  G/DL     11.5-16.0  10/31/2014 1:49pm  10/31/2014 1:58pm   

 

 Hematocrit  41  %     35-52  10/31/2014 1:49pm  10/31/2014 1:58pm   

 

 Mean Corpuscular Volume  93  FL     80-99  10/31/2014 1:49pm  10/31/2014 1:
58pm   

 

 Mean Corpuscular Hemoglobin  32  PG     25-34  10/31/2014 1:49pm  10/31/2014 1:
58pm   

 

 Mean Corpuscular Hemoglobin Concent  34  G/DL     32-36  10/31/2014 1:49pm  10/
 1:58pm   

 

 Red Cell Distribution Width  12.9  %     10.0-14.5  10/31/2014 1:49pm  10/31/
2014 1:58pm   

 

 Platelet Count  230  10^3/uL     130-400  10/31/2014 1:49pm  10/31/2014 1:58pm
   

 

 Mean Platelet Volume  9.4  FL     7.4-10.4  10/31/2014 1:49pm  10/31/2014 1:
58pm   

 

 Neutrophils (%) (Auto)  67  %     42-75  10/31/2014 1:49pm  10/31/2014 1:58pm 
  

 

 Lymphocytes (%) (Auto)  25  %     12-44  10/31/2014 1:49pm  10/31/2014 1:58pm 
  

 

 Monocytes (%) (Auto)  7  %     0-12  10/31/2014 1:49pm  10/31/2014 1:58pm   

 

 Eosinophils (%) (Auto)  1  %     0-10  10/31/2014 1:49pm  10/31/2014 1:58pm   

 

 Basophils (%) (Auto)  0  %     0-10  10/31/2014 1:49pm  10/31/2014 1:58pm   

 

 Neutrophils # (Auto)  6.3  X 10^3     1.8-7.8  10/31/2014 1:49pm  10/31/2014 1:
58pm   

 

 Lymphocytes # (Auto)  2.3  X 10^3     1.0-4.0  10/31/2014 1:49pm  10/31/2014 1:
58pm   

 

 Monocytes # (Auto)  0.7  X 10^3     0.0-1.0  10/31/2014 1:49pm  10/31/2014 1:
58pm   

 

 Eosinophils # (Auto)  0.1  10^3/uL     0.0-0.3  10/31/2014 1:49pm  10/31/2014 1
:58pm   

 

 Basophils # (Auto)  0.0  10^3/uL     0.0-0.1  10/31/2014 1:49pm  10/31/2014 1:
58pm   

 

 Sodium Level  140  MMOL/L     135-145  10/31/2014 1:49pm  10/31/2014 2:15pm   

 

 Potassium Level  3.8  MMOL/L     3.6-5.0  10/31/2014 1:49pm  10/31/2014 2:15pm
   

 

 Chloride Level  105  MMOL/L       10/31/2014 1:49pm  10/31/2014 2:15pm   

 

 Carbon Dioxide Level  22  MMOL/L     21-32  10/31/2014 1:49pm  10/31/2014 2:
15pm   

 

 Blood Urea Nitrogen  13  MG/DL     7-18  10/31/2014 1:49pm  10/31/2014 2:15pm 
  

 

 Creatinine  0.70  MG/DL     0.60-1.30  10/31/2014 1:49pm  10/31/2014 2:15pm   

 

 BUN/Creatinine Ratio  19           10/31/2014 1:49pm  10/31/2014 2:15pm   

 

 Estimat Glomerular Filtration Rate  > 60           10/31/2014 1:49pm  10/31/
2014 2:15pm                    GFR INTERPRETIVE DATA



         UNITS FOR ESTIMATED GFR (eGFR): mL/min/1.73 M2

         REFERENCE RANGE FOR ESTIMATED GFR (eGFR)

                                          eGFR

         NORMAL eGFR                       >60

         MODERATELY DECREASED eGFR          30-59

         SEVERLY DECREASED eGFR             15-29

         KIDNEY FAILURE                    <15 (OR DIALYSIS)

 

 Glucose Level  139  MG/DL  H    10/31/2014 1:49pm  10/31/2014 2:15pm   

 

 Calcium Level  9.3  MG/DL     8.5-10.1  10/31/2014 1:49pm  10/31/2014 2:15pm   

 

 Total Bilirubin  0.3  MG/DL     0.1-1.0  10/31/2014 1:49pm  10/31/2014 2:15pm 
  

 

 Alkaline Phosphatase  86  U/L       10/31/2014 1:49pm  10/31/2014 2:15pm
   

 

 Aspartate Amino Transf (AST/SGOT)  19  U/L     5-34  10/31/2014 1:49pm  10/31/
2014 2:15pm   

 

 Alanine Aminotransferase (ALT/SGPT)  19  U/L     0-55  10/31/2014 1:49pm  10/31
/2014 2:15pm   

 

 Total Protein  7.7  G/DL     6.4-8.2  10/31/2014 1:49pm  10/31/2014 2:15pm   

 

 Albumin  4.4  G/DL     3.2-4.5  10/31/2014 1:49pm  10/31/2014 2:15pm   

 

 White Blood Count  8.7  10^3/uL     4.3-11.0  2014 5:152014 5:
37am   

 

 Red Blood Count  4.46  10^6/uL     4.35-5.85  2014 5:152014 5:
37am   

 

 Hemoglobin  14.3  G/DL     11.5-16.0  2014 5:152014 5:37am   

 

 Hematocrit  42  %     35-52  2014 5:152014 5:37am   

 

 Mean Corpuscular Volume  94  FL     80-99  2014 5:2014 5:
37am   

 

 Mean Corpuscular Hemoglobin  32  PG     25-34  2014 5:152014 5:
37am   

 

 Mean Corpuscular Hemoglobin Concent  34  G/DL     32-36  2014 5:15 5:37am   

 

 Red Cell Distribution Width  13.1  %     10.0-14.5  2014 5:152014 5:37am   

 

 Platelet Count  259  10^3/uL     130-400  2014 5:152014 5:37am
   

 

 Mean Platelet Volume  9.5  FL     7.4-10.4  2014 5:152014 5:
37am   

 

 Neutrophils (%) (Auto)  68  %     42-75  2014 5:152014 5:37am 
  

 

 Lymphocytes (%) (Auto)  21  %     12-44  2014 5:152014 5:37am 
  

 

 Monocytes (%) (Auto)  10  %     0-12  2014 5:152014 5:37am   

 

 Eosinophils (%) (Auto)  1  %     0-10  2014 5:2014 5:37am   

 

 Basophils (%) (Auto)  0  %     0-10  2014 5:2014 5:37am   

 

 Neutrophils # (Auto)  5.9  X 10^3     1.8-7.8  2014 5:2014 5:
37am   

 

 Lymphocytes # (Auto)  1.9  X 10^3     1.0-4.0  2014 5:2014 5:
37am   

 

 Monocytes # (Auto)  0.9  X 10^3     0.0-1.0  2014 5:2014 5:
37am   

 

 Eosinophils # (Auto)  0.0  10^3/uL     0.0-0.3  2014 5:2014 5
:37am   

 

 Basophils # (Auto)  0.0  10^3/uL     0.0-0.1  2014 5:2014 5:
37am   

 

 Sodium Level  138  MMOL/L     135-145  2014 5:2014 5:43am   

 

 Potassium Level  4.0  MMOL/L     3.6-5.0  2014 5:2014 5:43am
   

 

 Chloride Level  99  MMOL/L       2014 5:2014 5:43am   

 

 Carbon Dioxide Level  24  MMOL/L     21-32  2014 5:2014 5:
43am   

 

 Blood Urea Nitrogen  22  MG/DL  H  7-18  2014 5:2014 5:43am 
  

 

 Creatinine  0.79  MG/DL     0.60-1.30  2014 5:2014 5:43am   

 

 BUN/Creatinine Ratio  28           2014 5:152014 5:43am   

 

 Estimat Glomerular Filtration Rate  > 60           2014 5:152014 5:43am                    GFR INTERPRETIVE DATA



         UNITS FOR ESTIMATED GFR (eGFR): mL/min/1.73 M2

         REFERENCE RANGE FOR ESTIMATED GFR (eGFR)

                                          eGFR

         NORMAL eGFR                       >60

         MODERATELY DECREASED eGFR          30-59

         SEVERLY DECREASED eGFR             15-29

         KIDNEY FAILURE                    <15 (OR DIALYSIS)

 

 Glucose Level  132  MG/DL  H    2014 5:15am  2014 5:43am   

 

 Calcium Level  9.4  MG/DL     8.5-10.1  2014 5:15am  2014 5:43am   

 

 Magnesium Level  2.4  MG/DL     1.8-2.4  2014 5:15am  2014 5:43am 
  







Procedures







 Procedure  Status  Date  Provider(s)

 

 BIOPSY OF SALIVARY GLAND  completed  10/22/14  JERRELL COMER MD

 

 Esophagogastroduodenoscopy (EGD) with placement of percutaneous endoscopic 
gastrostomy (PEG)  completed  14  SHARON STEVENS MD







Encounters







 Encounter  Location  Date/Time

 

 Registered Surgical Day Care  [facility]  14 8:08am

 

 Registered Recurring  [facility]  14 7:40am

 

 Registered Clinic  [facility]  14 7:25am

 

 Departed Emergency Room  [facility]  14 5:09am

 

 Registered Clinic  [facility]  10/31/14 1:24pm

 

 Registered Surgical Day Care  [facility]  10/22/14 10:55am

## 2017-02-13 NOTE — XMS REPORT
Continuity of Care Document

 Created on: 2014



YARITZA EHRZOG

External Reference #: [id number]

: 1950

Sex: Female



Demographics







 Address  [address]

[place]  [address]

 

 Home Phone  [phone]

 

 Preferred Language  English

 

 Marital Status  Unknown

 

 Adventism Affiliation  Unknown

 

 Race  Unknown

 

 Ethnic Group  Unknown





Author







 Author  [facility]

 

 Organization  [facility]

 

 Address  Unknown

 

 Phone  Unavailable







Support







 Name  Relationship  Address  Phone

 

 BREANA AMARAL MD  Caregiver  [address], [address]

[place]  [address] [phone]

 

 SHARON STEVENS MD  Caregiver  [address] [place]  [address] [phone]

 

 MANDO MARIE  Caregiver  [address] [place], [place]  [address] [phone]

 

 LORENA SCHNEID[place]  [address] [phone]







Care Team Providers







 Care Team Member Name  Role  Phone

 

 BREANA AMARAL MD  PCP  [phone]







Insurance Providers







 Payer Name  Policy Number  Subscriber Name  Relationship

 

 Self Pay     [employer] KENIA  18 Self / Same As Patient







Advance Directives







 Directive  Response  Recorded Date/Time

 

 Advance Directives  Yes  14 8:30am

 

 Health Care Power of   No  14 8:30am

 

 Organ Donor  Yes  14 8:30am

 

 Resuscitation Status  Full Code  14 8:30am







Problems

Medical Problems





 Problem  Onset Date  Status

 

 Fecal impaction  Unknown  Active







Medications







 Medication  Dose  Route  Sig  Days/Qty  Instructions  Order Date  Discontinued 
Date  Status

 

 HCTZ/Lisinopril (Zestoretic)  1 Each  PO  DAILY        14     Active

 

 Tramadol Hcl  50 Mg  PO  THREE TIMES A DAY For Pain  30 Qty     10/02/14  11/14
/14  Discontinued

 

 Acetaminophen  325 Mg  PO  AS NEEDED        14     Active

 

 Potassium Chloride (Micro K)  1 Each  PO  DAILY WITH FOOD        14     
Active

 

 Ondansetron Hcl  8 Mg  PO  EVERY 8HRS PRN NAUSEA/VOMITING        14     
Active

 

 Omeprazole  20 Mg  GT  DAILY        14     Active

 

 Docusate Sodium  100 Mg  PO  TWICE A DAY        14     Active

 

 Polyethylene Glycol  17 Gm  GT           14     Active

 

 [Gi Cocktail]        AS DIRECTED        14     Active

 

 Pantoprazole Sodium  40 Mg  PO  DAILY  90 Qty     14     Active







Social History







 Social History Problem  Response  Recorded Date/Time

 

 Alcohol Use  Rarely Uses  2014 5:12am

 

 Recreational Drug Use  No  2014 5:12am

 

 Recent Foreign Travel  No  2014 8:20am

 

 Smoking Status  Former Smoker  2014 8:15am

 

 Do you dip or chew tobacco?  No  2014 8:15am









 Query  Response  Start Date  Stop Date

 

 Smoking Status  Former Smoker     09/10/2014







Hospital Discharge Instructions

No hospital discharge instructions.



Plan of Care

No plan of care.



Functional Status

No functional status results.



Allergies, Adverse Reactions, Alerts







 Allergen  Type  Severity  Reaction  Status  Last Updated

 

 Codeine  Allergy  Unknown  NAUSEA  Active  14







Immunizations

No immunization records.



Vital Signs

Acute Vital Signs





 Vital  Response  Date/Time

 

 Temperature (Fahrenheit)  97.3 degrees F (97.6 - 99.5)   

 

 Temperature (Calculated Celsius)  36.16299 degrees C (36.4 - 37.5)   

 

 Temperature Source  Tympanic     

 

 Pulse Rate (adult)  81 bpm (60 - 90)   

 

 Respiratory Rate  16 bpm (12 - 24)   

 

 O2 Sat by Pulse Oximetry  98 % (88 - 100)   

 

 Blood Pressure  124/69 mm Hg   

 

 Pain      

 

    Pain Intensity  5     

 

 Height (Feet)  5 feet    

 

 Height (Inches)  6.00 inches    

 

 Height (Calculated Centimeters)  167.982162 cm    

 

 Weight (Pounds)  171 pounds    

 

 Weight (Calculated Grams)  37415.296 gm    

 

 Weight (Calculated Kilograms)  77.812630 kilograms    

 

 Calculated BMI  21.09     







Results

Laboratory Results







 Test Name  Result  Units  Flags  Reference  Collection Date/Time  Result Date/
Time  Comments

 

 White Blood Count  9.0  10^3/uL     4.3-11.0  2014 2:41pm  2014 2:
49pm   

 

 Red Blood Count  4.01  10^6/uL  L  4.35-5.85  2014 2:41pm  2014 2:
49pm   

 

 Hemoglobin  12.8  G/DL     11.5-16.0  2014 2:41pm  2014 2:49pm   

 

 Hematocrit  38  %     35-52  2014 2:41pm  2014 2:49pm   

 

 Mean Corpuscular Volume  94  FL     80-99  2014 2:41pm  2014 2:
49pm   

 

 Mean Corpuscular Hemoglobin  32  PG     25-34  2014 2:41pm  2014 2:
49pm   

 

 Mean Corpuscular Hemoglobin Concent  34  G/DL     32-36  2014 2:41pm   2:49pm   

 

 Red Cell Distribution Width  13.2  %     10.0-14.5  2014 2:41pm  2014 2:49pm   

 

 Platelet Count  186  10^3/uL     130-400  2014 2:41pm  2014 2:49pm
   

 

 Mean Platelet Volume  9.3  FL     7.4-10.4  2014 2:41pm  2014 2:
49pm   

 

 Neutrophils (%) (Auto)  74  %     42-75  2014 2:41pm  2014 2:49pm 
  

 

 Lymphocytes (%) (Auto)  18  %     12-44  2014 2:41pm  2014 2:49pm 
  

 

 Monocytes (%) (Auto)  7  %     0-12  2014 2:41pm  2014 2:49pm   

 

 Eosinophils (%) (Auto)  1  %     0-10  2014 2:41pm  2014 2:49pm   

 

 Basophils (%) (Auto)  0  %     0-10  2014 2:41pm  2014 2:49pm   

 

 Neutrophils # (Auto)  6.7  X 10^3     1.8-7.8  2014 2:41pm  2014 2:
49pm   

 

 Lymphocytes # (Auto)  1.6  X 10^3     1.0-4.0  2014 2:41pm  2014 2:
49pm   

 

 Monocytes # (Auto)  0.6  X 10^3     0.0-1.0  2014 2:41pm  2014 2:
49pm   

 

 Eosinophils # (Auto)  0.1  10^3/uL     0.0-0.3  2014 2:41pm  2014 2
:49pm   

 

 Basophils # (Auto)  0.0  10^3/uL     0.0-0.1  2014 2:41pm  2014 2:
49pm   

 

 Sodium Level  137  MMOL/L     135-145  2014 2:41pm  2014 3:10pm   

 

 Potassium Level  3.9  MMOL/L     3.6-5.0  2014 2:41pm  2014 3:10pm
   

 

 Chloride Level  102  MMOL/L       2014 2:41pm  2014 3:10pm   

 

 Carbon Dioxide Level  23  MMOL/L     21-32  2014 2:41pm  2014 3:
10pm   

 

 Blood Urea Nitrogen  17  MG/DL     7-18  2014 2:41pm  2014 3:10pm 
  

 

 Creatinine  0.75  MG/DL     0.60-1.30  2014 2:41pm  2014 3:10pm   

 

 BUN/Creatinine Ratio  23           2014 2:41pm  2014 3:10pm   

 

 Estimat Glomerular Filtration Rate  > 60           2014 2:41pm  2014 3:10pm                    GFR INTERPRETIVE DATA



         UNITS FOR ESTIMATED GFR (eGFR): mL/min/1.73 M2

         REFERENCE RANGE FOR ESTIMATED GFR (eGFR)

                                          eGFR

         NORMAL eGFR                       >60

         MODERATELY DECREASED eGFR          30-59

         SEVERLY DECREASED eGFR             15-29

         KIDNEY FAILURE                    <15 (OR DIALYSIS)

 

 Glucose Level  143  MG/DL  H    2014 2:41pm  2014 3:10pm   

 

 Calcium Level  8.9  MG/DL     8.5-10.1  2014 2:41pm  2014 3:10pm   

 

 Magnesium Level  2.1  MG/DL     1.8-2.4  2014 2:41pm  2014 3:10pm 
  

 

 Total Bilirubin  0.2  MG/DL     0.1-1.0  2014 2:41pm  2014 3:10pm 
  

 

 Alkaline Phosphatase  84  U/L       2014 2:41pm  2014 3:10pm
   

 

 Aspartate Amino Transf (AST/SGOT)  11  U/L     5-34  2014 2:41pm  2014 3:10pm   

 

 Alanine Aminotransferase (ALT/SGPT)  17  U/L     0-55  2014 2:41pm   3:10pm   

 

 Total Protein  7.2  G/DL     6.4-8.2  2014 2:41pm  2014 3:10pm   

 

 Albumin  4.1  G/DL     3.2-4.5  2014 2:41pm  2014 3:10pm   

 

 White Blood Count  8.8  10^3/uL     4.3-11.0  10/22/2014 12:10pm  10/22/2014 12
:21pm   

 

 Red Blood Count  4.48  10^6/uL     4.35-5.85  10/22/2014 12:10pm  10/22/2014 12
:21pm   

 

 Hemoglobin  14.0  G/DL     11.5-16.0  10/22/2014 12:10pm  10/22/2014 12:21pm   

 

 Hematocrit  42  %     35-52  10/22/2014 12:10pm  10/22/2014 12:21pm   

 

 Mean Corpuscular Volume  94  FL     80-99  10/22/2014 12:10pm  10/22/2014 12:
21pm   

 

 Mean Corpuscular Hemoglobin  31  PG     25-34  10/22/2014 12:10pm  10/22/2014 
12:21pm   

 

 Mean Corpuscular Hemoglobin Concent  33  G/DL     32-36  10/22/2014 12:10pm  10
/ 12:21pm   

 

 Red Cell Distribution Width  13.2  %     10.0-14.5  10/22/2014 12:10pm  10/22/
2014 12:21pm   

 

 Platelet Count  236  10^3/uL     130-400  10/22/2014 12:10pm  10/22/2014 12:
21pm   

 

 Mean Platelet Volume  9.6  FL     7.4-10.4  10/22/2014 12:10pm  10/22/2014 12:
21pm   

 

 Neutrophils (%) (Auto)  61  %     42-75  10/22/2014 12:10pm  10/22/2014 12:
21pm   

 

 Lymphocytes (%) (Auto)  30  %     12-44  10/22/2014 12:10pm  10/22/2014 12:
21pm   

 

 Monocytes (%) (Auto)  7  %     0-12  10/22/2014 12:10pm  10/22/2014 12:21pm   

 

 Eosinophils (%) (Auto)  1  %     0-10  10/22/2014 12:10pm  10/22/2014 12:21pm 
  

 

 Basophils (%) (Auto)  1  %     0-10  10/22/2014 12:10pm  10/22/2014 12:21pm   

 

 Neutrophils # (Auto)  5.4  X 10^3     1.8-7.8  10/22/2014 12:10pm  10/22/2014 
12:21pm   

 

 Lymphocytes # (Auto)  2.7  X 10^3     1.0-4.0  10/22/2014 12:10pm  10/22/2014 
12:21pm   

 

 Monocytes # (Auto)  0.6  X 10^3     0.0-1.0  10/22/2014 12:10pm  10/22/2014 12:
21pm   

 

 Eosinophils # (Auto)  0.1  10^3/uL     0.0-0.3  10/22/2014 12:10pm  10/22/2014 
12:21pm   

 

 Basophils # (Auto)  0.1  10^3/uL     0.0-0.1  10/22/2014 12:10pm  10/22/2014 12
:21pm   

 

 Prothrombin Time  12.9  SEC     12.2-14.7  10/22/2014 12:10pm  10/22/2014 1:
02pm   

 

 INR Comment  1.0        0.8-1.4  10/22/2014 12:10pm  10/22/2014 1:02pm        
               INTERPRETIVE DATA

SUGGESTED THERAPEUTIC RANGE FOR INR'S:

   VENOUS THROMBOSIS, PULMONARY EMBOLISM, OR PREVENTION OF

   SYSTEMIC EMBOLISM (EG. IN ATRIAL FIBRILLATION):

                     2.0  -  3.0

   MECHANICAL PROSTHETIC HEART VALVES:

                     2.5  -  3.5*

*NOTE:  INR'S UP TO 4.5 MAY BE NECESSARY IN SELECTED GROUPS 

        OF HIGH RISK PATIENTS.

[employer] CONSENSUS

CONFERENCE ON ANTITHROMBOTIC THERAPY (2000).

 

 Activated Partial Thromboplast Time  26  SEC     24-35  10/22/2014 12:10pm  10/
 1:02pm   

 

 White Blood Count  9.3  10^3/uL     4.3-11.0  10/31/2014 1:49pm  10/31/2014 1:
58pm   

 

 Red Blood Count  4.41  10^6/uL     4.35-5.85  10/31/2014 1:49pm  10/31/2014 1:
58pm   

 

 Hemoglobin  14.0  G/DL     11.5-16.0  10/31/2014 1:49pm  10/31/2014 1:58pm   

 

 Hematocrit  41  %     35-52  10/31/2014 1:49pm  10/31/2014 1:58pm   

 

 Mean Corpuscular Volume  93  FL     80-99  10/31/2014 1:49pm  10/31/2014 1:
58pm   

 

 Mean Corpuscular Hemoglobin  32  PG     25-34  10/31/2014 1:49pm  10/31/2014 1:
58pm   

 

 Mean Corpuscular Hemoglobin Concent  34  G/DL     32-36  10/31/2014 1:49pm  10/
 1:58pm   

 

 Red Cell Distribution Width  12.9  %     10.0-14.5  10/31/2014 1:49pm  10/31/
2014 1:58pm   

 

 Platelet Count  230  10^3/uL     130-400  10/31/2014 1:49pm  10/31/2014 1:58pm
   

 

 Mean Platelet Volume  9.4  FL     7.4-10.4  10/31/2014 1:49pm  10/31/2014 1:
58pm   

 

 Neutrophils (%) (Auto)  67  %     42-75  10/31/2014 1:49pm  10/31/2014 1:58pm 
  

 

 Lymphocytes (%) (Auto)  25  %     12-44  10/31/2014 1:49pm  10/31/2014 1:58pm 
  

 

 Monocytes (%) (Auto)  7  %     0-12  10/31/2014 1:49pm  10/31/2014 1:58pm   

 

 Eosinophils (%) (Auto)  1  %     0-10  10/31/2014 1:49pm  10/31/2014 1:58pm   

 

 Basophils (%) (Auto)  0  %     0-10  10/31/2014 1:49pm  10/31/2014 1:58pm   

 

 Neutrophils # (Auto)  6.3  X 10^3     1.8-7.8  10/31/2014 1:49pm  10/31/2014 1:
58pm   

 

 Lymphocytes # (Auto)  2.3  X 10^3     1.0-4.0  10/31/2014 1:49pm  10/31/2014 1:
58pm   

 

 Monocytes # (Auto)  0.7  X 10^3     0.0-1.0  10/31/2014 1:49pm  10/31/2014 1:
58pm   

 

 Eosinophils # (Auto)  0.1  10^3/uL     0.0-0.3  10/31/2014 1:49pm  10/31/2014 1
:58pm   

 

 Basophils # (Auto)  0.0  10^3/uL     0.0-0.1  10/31/2014 1:49pm  10/31/2014 1:
58pm   

 

 Sodium Level  140  MMOL/L     135-145  10/31/2014 1:49pm  10/31/2014 2:15pm   

 

 Potassium Level  3.8  MMOL/L     3.6-5.0  10/31/2014 1:49pm  10/31/2014 2:15pm
   

 

 Chloride Level  105  MMOL/L       10/31/2014 1:49pm  10/31/2014 2:15pm   

 

 Carbon Dioxide Level  22  MMOL/L     21-32  10/31/2014 1:49pm  10/31/2014 2:
15pm   

 

 Blood Urea Nitrogen  13  MG/DL     7-18  10/31/2014 1:49pm  10/31/2014 2:15pm 
  

 

 Creatinine  0.70  MG/DL     0.60-1.30  10/31/2014 1:49pm  10/31/2014 2:15pm   

 

 BUN/Creatinine Ratio  19           10/31/2014 1:49pm  10/31/2014 2:15pm   

 

 Estimat Glomerular Filtration Rate  > 60           10/31/2014 1:49pm  10/31/
2014 2:15pm                    GFR INTERPRETIVE DATA



         UNITS FOR ESTIMATED GFR (eGFR): mL/min/1.73 M2

         REFERENCE RANGE FOR ESTIMATED GFR (eGFR)

                                          eGFR

         NORMAL eGFR                       >60

         MODERATELY DECREASED eGFR          30-59

         SEVERLY DECREASED eGFR             15-29

         KIDNEY FAILURE                    <15 (OR DIALYSIS)

 

 Glucose Level  139  MG/DL  H    10/31/2014 1:49pm  10/31/2014 2:15pm   

 

 Calcium Level  9.3  MG/DL     8.5-10.1  10/31/2014 1:49pm  10/31/2014 2:15pm   

 

 Total Bilirubin  0.3  MG/DL     0.1-1.0  10/31/2014 1:49pm  10/31/2014 2:15pm 
  

 

 Alkaline Phosphatase  86  U/L       10/31/2014 1:49pm  10/31/2014 2:15pm
   

 

 Aspartate Amino Transf (AST/SGOT)  19  U/L     5-34  10/31/2014 1:49pm  10/31/
2014 2:15pm   

 

 Alanine Aminotransferase (ALT/SGPT)  19  U/L     0-55  10/31/2014 1:49pm  10/31
/2014 2:15pm   

 

 Total Protein  7.7  G/DL     6.4-8.2  10/31/2014 1:49pm  10/31/2014 2:15pm   

 

 Albumin  4.4  G/DL     3.2-4.5  10/31/2014 1:49pm  10/31/2014 2:15pm   

 

 White Blood Count  8.7  10^3/uL     4.3-11.0  2014 5:152014 5:
37am   

 

 Red Blood Count  4.46  10^6/uL     4.35-5.85  2014 5:152014 5:
37am   

 

 Hemoglobin  14.3  G/DL     11.5-16.0  2014 5:152014 5:37am   

 

 Hematocrit  42  %     35-52  2014 5:152014 5:37am   

 

 Mean Corpuscular Volume  94  FL     80-99  2014 5:2014 5:
37am   

 

 Mean Corpuscular Hemoglobin  32  PG     25-34  2014 5:152014 5:
37am   

 

 Mean Corpuscular Hemoglobin Concent  34  G/DL     32-36  2014 5:15 5:37am   

 

 Red Cell Distribution Width  13.1  %     10.0-14.5  2014 5:152014 5:37am   

 

 Platelet Count  259  10^3/uL     130-400  2014 5:152014 5:37am
   

 

 Mean Platelet Volume  9.5  FL     7.4-10.4  2014 5:152014 5:
37am   

 

 Neutrophils (%) (Auto)  68  %     42-75  2014 5:152014 5:37am 
  

 

 Lymphocytes (%) (Auto)  21  %     12-44  2014 5:152014 5:37am 
  

 

 Monocytes (%) (Auto)  10  %     0-12  2014 5:152014 5:37am   

 

 Eosinophils (%) (Auto)  1  %     0-10  2014 5:2014 5:37am   

 

 Basophils (%) (Auto)  0  %     0-10  2014 5:2014 5:37am   

 

 Neutrophils # (Auto)  5.9  X 10^3     1.8-7.8  2014 5:2014 5:
37am   

 

 Lymphocytes # (Auto)  1.9  X 10^3     1.0-4.0  2014 5:2014 5:
37am   

 

 Monocytes # (Auto)  0.9  X 10^3     0.0-1.0  2014 5:2014 5:
37am   

 

 Eosinophils # (Auto)  0.0  10^3/uL     0.0-0.3  2014 5:2014 5
:37am   

 

 Basophils # (Auto)  0.0  10^3/uL     0.0-0.1  2014 5:2014 5:
37am   

 

 Sodium Level  138  MMOL/L     135-145  2014 5:2014 5:43am   

 

 Potassium Level  4.0  MMOL/L     3.6-5.0  2014 5:2014 5:43am
   

 

 Chloride Level  99  MMOL/L       2014 5:2014 5:43am   

 

 Carbon Dioxide Level  24  MMOL/L     21-32  2014 5:2014 5:
43am   

 

 Blood Urea Nitrogen  22  MG/DL  H  7-18  2014 5:2014 5:43am 
  

 

 Creatinine  0.79  MG/DL     0.60-1.30  2014 5:2014 5:43am   

 

 BUN/Creatinine Ratio  28           2014 5:152014 5:43am   

 

 Estimat Glomerular Filtration Rate  > 60           2014 5:152014 5:43am                    GFR INTERPRETIVE DATA



         UNITS FOR ESTIMATED GFR (eGFR): mL/min/1.73 M2

         REFERENCE RANGE FOR ESTIMATED GFR (eGFR)

                                          eGFR

         NORMAL eGFR                       >60

         MODERATELY DECREASED eGFR          30-59

         SEVERLY DECREASED eGFR             15-29

         KIDNEY FAILURE                    <15 (OR DIALYSIS)

 

 Glucose Level  132  MG/DL  H    2014 5:15am  2014 5:43am   

 

 Calcium Level  9.4  MG/DL     8.5-10.1  2014 5:15am  2014 5:43am   

 

 Magnesium Level  2.4  MG/DL     1.8-2.4  2014 5:15am  2014 5:43am 
  







Procedures







 Procedure  Status  Date  Provider(s)

 

 BIOPSY OF SALIVARY GLAND  completed  10/22/14  JERRELL COMER MD

 

 Esophagogastroduodenoscopy (EGD) with placement of percutaneous endoscopic 
gastrostomy (PEG)  completed  14  SHARON STEVENS MD







Encounters







 Encounter  Location  Date/Time

 

 Registered Surgical Day Care  [facility]  14 8:08am

 

 Registered Recurring  [facility]  14 7:40am

 

 Registered Clinic  [facility]  14 7:25am

 

 Departed Emergency Room  [facility]  14 5:09am

 

 Registered Clinic  [facility]  10/31/14 1:24pm

 

 Registered Surgical Day Care  [facility]  10/22/14 10:55am

## 2017-04-18 LAB
ALBUMIN SERPL-MCNC: 4.1 G/DL (ref 3.2–4.5)
ALT SERPL-CCNC: 13 U/L (ref 0–55)
ANION GAP SERPL CALC-SCNC: 10 MMOL/L (ref 5–14)
AST SERPL-CCNC: 14 U/L (ref 5–34)
BASOPHILS # BLD AUTO: 0 10^3/UL (ref 0–0.1)
BASOPHILS NFR BLD AUTO: 0 % (ref 0–10)
BILIRUB SERPL-MCNC: 0.4 MG/DL (ref 0.1–1)
BUN SERPL-MCNC: 22 MG/DL (ref 7–18)
BUN/CREAT SERPL: 19
CALCIUM SERPL-MCNC: 9.4 MG/DL (ref 8.5–10.1)
CHLORIDE SERPL-SCNC: 102 MMOL/L (ref 98–107)
CO2 SERPL-SCNC: 27 MMOL/L (ref 21–32)
CREAT SERPL-MCNC: 1.15 MG/DL (ref 0.6–1.3)
EOSINOPHIL # BLD AUTO: 0.2 10^3/UL (ref 0–0.3)
EOSINOPHIL NFR BLD AUTO: 3 % (ref 0–10)
ERYTHROCYTE [DISTWIDTH] IN BLOOD BY AUTOMATED COUNT: 13.5 % (ref 10–14.5)
GFR SERPLBLD BASED ON 1.73 SQ M-ARVRAT: 47 ML/MIN
GLUCOSE SERPL-MCNC: 134 MG/DL (ref 70–105)
LYMPHOCYTES # BLD AUTO: 1.3 X 10^3 (ref 1–4)
LYMPHOCYTES NFR BLD AUTO: 20 % (ref 12–44)
MCH RBC QN AUTO: 31 PG (ref 25–34)
MCHC RBC AUTO-ENTMCNC: 33 G/DL (ref 32–36)
MCV RBC AUTO: 94 FL (ref 80–99)
MONOCYTES # BLD AUTO: 0.5 X 10^3 (ref 0–1)
MONOCYTES NFR BLD AUTO: 8 % (ref 0–12)
NEUTROPHILS # BLD AUTO: 4.5 X 10^3 (ref 1.8–7.8)
NEUTROPHILS NFR BLD AUTO: 69 % (ref 42–75)
PLATELET # BLD: 218 10^3/UL (ref 130–400)
PMV BLD AUTO: 8.9 FL (ref 7.4–10.4)
POTASSIUM SERPL-SCNC: 5 MMOL/L (ref 3.6–5)
PROT SERPL-MCNC: 7 G/DL (ref 6.4–8.2)
RBC # BLD AUTO: 3.96 10^6/UL (ref 4.35–5.85)
SODIUM SERPL-SCNC: 139 MMOL/L (ref 135–145)
TSH SERPL-ACNC: 1.88 UIU/ML (ref 0.35–4.94)
WBC # BLD AUTO: 6.5 10^3/UL (ref 4.3–11)

## 2017-04-24 ENCOUNTER — HOSPITAL ENCOUNTER (OUTPATIENT)
Dept: HOSPITAL 75 - RAD | Age: 67
End: 2017-04-24
Attending: NURSE PRACTITIONER
Payer: MEDICARE

## 2017-04-24 DIAGNOSIS — N64.59: Primary | ICD-10-CM

## 2017-04-24 PROCEDURE — 77066 DX MAMMO INCL CAD BI: CPT

## 2017-04-24 NOTE — DIAGNOSTIC IMAGING REPORT
EXAMINATION:

Right breast ultrasound.



INDICATION:

Followup nodule at the 10 o'clock zone.



COMPARISON:

Correlation is made to prior exams including 04/08/2015.



FINDINGS:

There is a stable hypoechoic lobulated nodule measuring 5 mm in

size at the 10 o'clock zone 5 cm from the nipple. This is

without significant change or concerning features, compatible

with a benign etiology, possibly an intramammary lymph node.



IMPRESSION:

Stable 5 mm nodule at the 10 o'clock zone for 2 years is

compatible with a benign etiology. Annual screening mammograms

are recommended.



ACR BI-RADS Category 2: Benign findings.

Result letter will be mailed to the patient.

Note: At least 10% of breast cancer is not imaged by mammography.



Dictated by:



Dictated on workstation # [id number]

## 2017-04-24 NOTE — DIAGNOSTIC IMAGING REPORT
EXAMINATION:

Bilateral diagnostic mammogram with a Computer Aided Detection

(CAD) system.



INDICATION:

Nonspecific thickening in the breasts. Followup ultrasound

abnormality at the 10 o'clock zone in the right breast.



FINDINGS:

The breasts are composed of heterogeneously dense parenchyma

which may decrease mammographic sensitivity. Most of the breast

density is lateral and more prominent on the left side with the

rest of the breasts demonstrating less dense parenchyma. Benign

appearing calcifications are seen. No developing mass,

architectural distortion, or suspicious calcifications.



IMPRESSION:

Stable mammographic findings with no evidence of malignancy. The

ultrasound evaluation is pending.



ACR BI-RADS Category 0: Incomplete. (Needs additional imaging

evaluation).

Result letter will be mailed to the patient.

Note: At least 10% of breast cancer is not imaged by mammography.



Dictated by:



Dictated on workstation # [id number]

## 2017-05-01 ENCOUNTER — HOSPITAL ENCOUNTER (OUTPATIENT)
Dept: HOSPITAL 75 - WOUNDCARE | Age: 67
Discharge: HOME | End: 2017-05-01
Attending: SURGERY
Payer: MEDICARE

## 2017-05-01 DIAGNOSIS — M87.9: Primary | ICD-10-CM

## 2017-05-01 DIAGNOSIS — M27.8: ICD-10-CM

## 2017-05-01 DIAGNOSIS — C14.0: ICD-10-CM

## 2017-05-01 DIAGNOSIS — K04.7: ICD-10-CM

## 2017-05-01 PROCEDURE — 99213 OFFICE O/P EST LOW 20 MIN: CPT

## 2017-05-01 PROCEDURE — 99183 HYPERBARIC OXYGEN THERAPY: CPT

## 2017-05-01 PROCEDURE — 99212 OFFICE O/P EST SF 10 MIN: CPT

## 2017-05-15 ENCOUNTER — HOSPITAL ENCOUNTER (OUTPATIENT)
Dept: HOSPITAL 75 - ONC | Age: 67
LOS: 63 days | Discharge: HOME | End: 2017-07-17
Attending: INTERNAL MEDICINE
Payer: MEDICARE

## 2017-05-15 DIAGNOSIS — Z92.3: ICD-10-CM

## 2017-05-15 DIAGNOSIS — Z87.891: ICD-10-CM

## 2017-05-15 DIAGNOSIS — C32.1: Primary | ICD-10-CM

## 2017-05-15 DIAGNOSIS — Z92.21: ICD-10-CM

## 2017-05-15 LAB
ALBUMIN SERPL-MCNC: 4.4 G/DL (ref 3.2–4.5)
ALT SERPL-CCNC: 19 U/L (ref 0–55)
ANION GAP SERPL CALC-SCNC: 9 MMOL/L (ref 5–14)
AST SERPL-CCNC: 19 U/L (ref 5–34)
BILIRUB SERPL-MCNC: 0.6 MG/DL (ref 0.1–1)
BUN SERPL-MCNC: 19 MG/DL (ref 7–18)
BUN/CREAT SERPL: 17
CALCIUM SERPL-MCNC: 10 MG/DL (ref 8.5–10.1)
CHLORIDE SERPL-SCNC: 102 MMOL/L (ref 98–107)
CO2 SERPL-SCNC: 29 MMOL/L (ref 21–32)
CREAT SERPL-MCNC: 1.12 MG/DL (ref 0.6–1.3)
GFR SERPLBLD BASED ON 1.73 SQ M-ARVRAT: 49 ML/MIN
GLUCOSE SERPL-MCNC: 138 MG/DL (ref 70–105)
POTASSIUM SERPL-SCNC: 4.2 MMOL/L (ref 3.6–5)
PROT SERPL-MCNC: 7.5 G/DL (ref 6.4–8.2)
SODIUM SERPL-SCNC: 140 MMOL/L (ref 135–145)

## 2017-05-15 PROCEDURE — 85025 COMPLETE CBC W/AUTO DIFF WBC: CPT

## 2017-05-15 PROCEDURE — 80053 COMPREHEN METABOLIC PANEL: CPT

## 2017-05-15 PROCEDURE — 84443 ASSAY THYROID STIM HORMONE: CPT

## 2017-05-15 PROCEDURE — 36415 COLL VENOUS BLD VENIPUNCTURE: CPT

## 2017-05-15 PROCEDURE — 99213 OFFICE O/P EST LOW 20 MIN: CPT

## 2017-06-12 ENCOUNTER — HOSPITAL ENCOUNTER (EMERGENCY)
Dept: HOSPITAL 75 - ER | Age: 67
Discharge: HOME | End: 2017-06-12
Payer: MEDICARE

## 2017-06-12 VITALS — DIASTOLIC BLOOD PRESSURE: 90 MMHG | SYSTOLIC BLOOD PRESSURE: 131 MMHG

## 2017-06-12 VITALS — BODY MASS INDEX: 27.48 KG/M2 | WEIGHT: 171 LBS | HEIGHT: 66 IN

## 2017-06-12 DIAGNOSIS — S01.01XA: Primary | ICD-10-CM
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
## 2017-06-12 DIAGNOSIS — Z95.5: ICD-10-CM

## 2017-06-12 DIAGNOSIS — Z23: ICD-10-CM

## 2017-06-12 DIAGNOSIS — F17.210: ICD-10-CM

## 2017-06-12 DIAGNOSIS — Z79.82: ICD-10-CM

## 2017-06-12 DIAGNOSIS — I25.10: ICD-10-CM

## 2017-06-12 DIAGNOSIS — I10: ICD-10-CM
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
## 2017-06-12 LAB
BILIRUB UR QL STRIP: NEGATIVE
KETONES UR QL STRIP: NEGATIVE
LEUKOCYTE ESTERASE UR QL STRIP: NEGATIVE
NITRITE UR QL STRIP: NEGATIVE
PH UR STRIP: 6 [PH] (ref 5–9)
PROT UR QL STRIP: NEGATIVE
SP GR UR STRIP: 1.01 (ref 1.02–1.02)
SQUAMOUS #/AREA URNS HPF: (no result) /HPF
UROBILINOGEN UR-MCNC: NORMAL MG/DL
WBC #/AREA URNS HPF: (no result) /HPF

## 2017-06-12 PROCEDURE — 72125 CT NECK SPINE W/O DYE: CPT

## 2017-06-12 PROCEDURE — 81000 URINALYSIS NONAUTO W/SCOPE: CPT

## 2017-06-12 PROCEDURE — 70450 CT HEAD/BRAIN W/O DYE: CPT

## 2017-06-12 PROCEDURE — 90715 TDAP VACCINE 7 YRS/> IM: CPT

## 2017-06-12 PROCEDURE — 99282 EMERGENCY DEPT VISIT SF MDM: CPT

## 2017-06-12 NOTE — ED FALL/INJURY
General


Chief Complaint:  Trauma-Non Activation


Stated Complaint:  FALL/HEAD LACERATION


Nursing Triage Note:  


SEE TRAUMA NOTE.


Source:  patient


Exam Limitations:  no limitations





History of Present Illness


Time seen by provider:  08:00


Initial Comments


This delightful 67-year-old woman presents to the emergency room after having a 

fall in her home.  She was getting some things out of cabinet and spilled a 

beverage.  She consequently fell backwards and struck her head on what she 

believes was a cabinet door.  There was no loss of consciousness.  She denies 

any symptoms of concussion such as nausea, confusion, vision changes, headache, 

etc.  She has a 1.5 cm laceration on the right posterior parietal scalp.  She 

denies any other injury.  She is on aspirin and Plavix.


Location Injury Occurred:  HOME





Allergies and Home Medications


Allergies


Coded Allergies:  


     codeine (Unverified  Allergy, Unknown, NAUSEA, 14)





Home Medications


Aspirin 81 Mg Tab.chew, 81 MG PO HS, (Reported)


Atorvastatin Calcium 10 Mg Tablet, 10 MG PO HS, (Reported)


Clopidogrel Bisulfate 75 Mg Tablet, 75 MG PO HS, (Reported)


Lidocaine 700 Mg Adh..patch, 1 PATCH TP DAILY PRN for PAIN, (Reported)


Multivitamin/Iron/Folic Acid 1 Each Tablet, 1 TAB PO Q48H, (Reported)


   TAKES AT BEDTIME 


Pantoprazole Sodium 40 Mg Tablet.dr, 40 MG PO HS, (Reported)


Prochlorperazine Maleate 10 Mg Tablet, 10 MG PO Q6H PRN for nausea, #10 Ref 0


   Prescribed by: SHWETA OZUNA on 17 0839





Constitutional:  no symptoms reported


Eyes:  No Symptoms Reported


Ears, Nose, Mouth, Throat:  no symptoms reported


Respiratory:  no symptoms reported


Cardiovascular:  no symptoms reported


Gastrointestinal:  no symptoms reported


Genitourinary:  no symptoms reported


Musculoskeletal:  no symptoms reported


Skin:  see HPI


Psychiatric/Neurological:  No Symptoms Reported





Past Medical-Social-Family Hx


Patient Social History


Alcohol Use:  Denies Use


Recreational Drug Use:  No


Smoking Status:  Current Everyday Smoker


Type Used:  Cigarettes


Former Smoker/When Quit:  2014


2nd Hand Smoke Exposure:  Yes


Recent Foreign Travel:  No


Contact w/Someone Who Travel:  No


Recent Infectious Disease Expo:  No


Recent Hopitalizations:  No





Immunizations Up To Date


Tetanus Booster (TDap):  Unknown


Date of Influenza Vaccine:  Sep 6, 2015





Surgeries


HX Surgeries:  Yes


Surgeries:  Coronary Stent





Respiratory


Hx Respiratory Disorders:  No





Cardiovascular


Hx Cardiac Disorders:  Yes


Cardiac Disorders:  Coronary Artery Disease, Hypertension





Neurological


Hx Neurological Disorders:  No





Genitourinary


Hx Genitourinary Disorders:  No





Gastrointestinal


Hx Gastrointestinal Disorders:  Yes


Gastrointestinal Disorders:  Gastroesophageal Reflux





Musculoskeletal


Hx Musculoskeletal Disorders:  No





Endocrine


Hx Endocrine Disorders:  No





HEENT


HX ENT Disorders:  Yes (upper dentures)





Cancer


Hx Cancer:  Yes (THROAT)





Blood Transfusions


Hx Blood Disorders:  No





Physical Exam


Vital Signs





Vital Sign - Last 12Hours








 17





 08:10


 


Temp 97.4


 


Pulse 117


 


Resp 20


 


B/P (MAP) 131/90


 


Pulse Ox 96


 


O2 Delivery Room Air





Capillary Refill : Less Than 3 Seconds


General Appearance:  WD/WN, no apparent distress


HEENT:  PERRL/EOMI, normal ENT inspection, pharynx normal, other (1.5 cm 

laceration on the right posterior parietal scalp)


Neck:  non-tender, normal inspection


Cardiovascular:  regular rate, rhythm, no edema, no murmur


Respiratory:  lungs clear, normal breath sounds, no respiratory distress, no 

accessory muscle use


Gastrointestinal:  normal bowel sounds, non tender, soft


Extremities:  normal inspection, no pedal edema


Neurologic/Psychiatric:  CNs II-XII nml as tested, no motor/sensory deficits, 

alert, normal mood/affect, oriented x 3


Skin:  normal color, warm/dry





[place] Coma Score


Best Eye Response:  (4) Open Spontaneously


Best Verbal Response:  (5) Oriented


Best Motor Response:  (6) Obeys Commands


[place] Total:  15





Progress/Results/Core Measures


Results/Orders


Lab Results





Laboratory Tests








Test


  17


09:25 Range/Units


 


 


Urine Color YELLOW   


 


Urine Clarity CLEAR   


 


Urine pH 6  5-9  


 


Urine Specific Gravity 1.015 L 1.016-1.022  


 


Urine Protein NEGATIVE  NEGATIVE  


 


Urine Glucose (UA) NEGATIVE  NEGATIVE  


 


Urine Ketones NEGATIVE  NEGATIVE  


 


Urine Nitrite NEGATIVE  NEGATIVE  


 


Urine Bilirubin NEGATIVE  NEGATIVE  


 


Urine Urobilinogen NORMAL  NORMAL  MG/DL


 


Urine Leukocyte Esterase NEGATIVE  NEGATIVE  


 


Urine RBC (Auto) NEGATIVE  NEGATIVE  


 


Urine RBC NONE   /HPF


 


Urine WBC NONE   /HPF


 


Urine Squamous Epithelial


Cells 2-5 


   /HPF


 


 


Urine Crystals NONE   /LPF


 


Urine Bacteria NEGATIVE   /HPF


 


Urine Casts NONE   /LPF


 


Urine Mucus NEGATIVE   /LPF


 


Urine Culture Indicated NO   








My Orders





Orders - FOSTER HARDEN MD


Ct Head/Cervical Spine Wo (17 08:07)


Ua Culture If Indicated (17 08:07)


Dipht,Pertuss(Acell),Tet Adult (Boostrix (17 09:30)





Medications Given in ED





Current Medications








 Medications  Dose


 Ordered  Sig/Zachary


 Route  Start Time


 Stop Time Status Last Admin


Dose Admin


 


 Diphtheria/


 Tetanus/Acell


 Pertussis  0.5 ml  ONCE ONCE


 IM  17 09:30


 17 09:31 DC 17 09:26


0.5 ML








Vital Signs/I&O





Vital Sign - Last 12Hours








 17





 08:10 09:55


 


Temp 97.4 97.4


 


Pulse 117 117


 


Resp 20 20


 


B/P (MAP) 131/90 


 


Pulse Ox 96 96


 


O2 Delivery Room Air 














Blood Pressure Mean:  104








Progress Note :  


Progress Note


CT of the head and C-spine was negative.  Patient was asymptomatic for 

concussion.  Laceration did not require repair.  It was cleaned with water and 

chlorhexidine soap.





Diagnostic Imaging





   Diagonstic Imaging:  CT


   Plain Films/CT/US/NM/MRI:  c-spine, head


Comments


CT head and C-spine viewed by me and report reviewed.  See report below:





NAME:      YARITZA HERZOG


MED REC#:   [id number]


ACCOUNT#:   [id number]


PT STATUS:   DEP ER


:      1950


PHYSICIAN:    FOSTER HARDEN MD


ADMIT DATE:   17/ER


***Signed***


Date of Exam:   17





CT HEAD/CERVICAL SPINE WO





PROCEDURE: CT head and CT cervical spine without contrast.





TECHNIQUE: Multiple contiguous axial images were obtained through


the brain and cervical spine without the use of intravenous


contrast. Sagittal and coronal reformations through the cervical


spine were then performed.





INDICATION:  Fall.





FINDINGS:


CT head:


There is no intracranial hemorrhage, edema, or mass effect. The


brain parenchyma and gray-white matter differentiation is


preserved. No hydrocephalus. No extra-axial fluid collection is


seen.





No skull fracture is appreciated. There is a scalp laceration and


soft tissue air seen along the posterior right parietal region.





The visualized portions of the paranasal sinuses and orbits


appear grossly unremarkable.





CT cervical spine:


There is reversal of the lordotic curvature. The alignment of the


posterior spinal line, the facet joints and lateral masses of C1


and C2 are satisfactory. No widening of the predental space.


There are preserved vertebral body heights. Mild disc height loss


at C5-C6 is seen. No significant posterior osteophytes are noted.





Facet and uncovertebral joint hypertrophy is seen resulting in


mild neuroforaminal stenosis of moderate/ severe degree at C3-C4,


C4-C5, and C5-C6 bilaterally. No fracture seen.





IMPRESSION:


CT head: Unremarkable exam.





CT cervical spine: No fracture seen. Degenerative uncovertebral


and facet changes resulting in prominent foraminal stenosis


involving levels C3-C4, C4-C5, and C5-C6 bilaterally.





Dictated by: 





  Dictated on workstation # [id number]





[id number]





Dict:      17 0909


Trans:      17 112





Interpreted by:         JERRELL COMER MD


Electronically signed by:   JERRELL COMER MD 17 112





Departure


Impression


Impression:  


 Primary Impression:  


 Fall on same level


 Qualified Codes:  W18.30XA - Fall on same level, unspecified, initial encounter


 Additional Impression:  


 Scalp laceration


 Qualified Codes:  S01.01XA - Laceration without foreign body of scalp, initial 

encounter


Disposition:   HOME, SELF-CARE


Condition:  Improved





Departure-Patient Inst.


Decision time for Depature:  09:40


Referrals:  


PRATIK HERNANDES MD (PCP/Family)


Primary Care Physician


Patient Instructions:  Minor Head Injury (DC)





Add. Discharge Instructions:  


Return to care if you develop any neurologic symptoms such as headache, vision 

changes, irritability, confusion, nausea, etc.  Avoid washing her hair today.  

Avoid scrubbing directly over the wound on her head until it heals.  Monitor 

your wound for signs of infection such as increasing redness, increasing 

swelling, increasing pain, puslike drainage, or fever.  Return to care if you 

notice these symptoms.











All discharge instructions reviewed with patient and/or family. Voiced 

understanding.











FOSTER HARDEN MD 2017 09:56

## 2017-06-12 NOTE — DIAGNOSTIC IMAGING REPORT
PROCEDURE: CT head and CT cervical spine without contrast.



TECHNIQUE: Multiple contiguous axial images were obtained through

the brain and cervical spine without the use of intravenous

contrast. Sagittal and coronal reformations through the cervical

spine were then performed.



INDICATION:  Fall.



FINDINGS:

CT head:

There is no intracranial hemorrhage, edema, or mass effect. The

brain parenchyma and gray-white matter differentiation is

preserved. No hydrocephalus. No extra-axial fluid collection is

seen.



No skull fracture is appreciated. There is a scalp laceration and

soft tissue air seen along the posterior right parietal region.



The visualized portions of the paranasal sinuses and orbits

appear grossly unremarkable.



CT cervical spine:

There is reversal of the lordotic curvature. The alignment of the

posterior spinal line, the facet joints and lateral masses of C1

and C2 are satisfactory. No widening of the predental space.

There are preserved vertebral body heights. Mild disc height loss

at C5-C6 is seen. No significant posterior osteophytes are noted.



Facet and uncovertebral joint hypertrophy is seen resulting in

mild neuroforaminal stenosis of moderate/ severe degree at C3-C4,

C4-C5, and C5-C6 bilaterally. No fracture seen.



IMPRESSION:

CT head: Unremarkable exam.



CT cervical spine: No fracture seen. Degenerative uncovertebral

and facet changes resulting in prominent foraminal stenosis

involving levels C3-C4, C4-C5, and C5-C6 bilaterally.



Dictated by: 



  Dictated on workstation # [id number]

## 2017-07-20 ENCOUNTER — HOSPITAL ENCOUNTER (OUTPATIENT)
Dept: HOSPITAL 75 - ONC | Age: 67
LOS: 72 days | Discharge: HOME | End: 2017-09-30
Attending: INTERNAL MEDICINE
Payer: MEDICARE

## 2017-07-20 LAB
ALBUMIN SERPL-MCNC: 4.3 GM/DL (ref 3.2–4.5)
ALT SERPL-CCNC: 12 U/L (ref 0–55)
ANION GAP SERPL CALC-SCNC: 9 MMOL/L (ref 5–14)
AST SERPL-CCNC: 13 U/L (ref 5–34)
BASOPHILS # BLD AUTO: 0 10^3/UL (ref 0–0.1)
BASOPHILS NFR BLD AUTO: 0 % (ref 0–10)
BILIRUB SERPL-MCNC: 0.3 MG/DL (ref 0.1–1)
BUN SERPL-MCNC: 18 MG/DL (ref 7–18)
BUN/CREAT SERPL: 18
CALCIUM SERPL-MCNC: 9.4 MG/DL (ref 8.5–10.1)
CHLORIDE SERPL-SCNC: 102 MMOL/L (ref 98–107)
CO2 SERPL-SCNC: 27 MMOL/L (ref 21–32)
CREAT SERPL-MCNC: 1.01 MG/DL (ref 0.6–1.3)
EOSINOPHIL # BLD AUTO: 0.1 10^3/UL (ref 0–0.3)
EOSINOPHIL NFR BLD AUTO: 2 % (ref 0–10)
ERYTHROCYTE [DISTWIDTH] IN BLOOD BY AUTOMATED COUNT: 13.1 % (ref 10–14.5)
GFR SERPLBLD BASED ON 1.73 SQ M-ARVRAT: 55 ML/MIN
GLUCOSE SERPL-MCNC: 123 MG/DL (ref 70–105)
LYMPHOCYTES # BLD AUTO: 1.4 X 10^3 (ref 1–4)
LYMPHOCYTES NFR BLD AUTO: 22 % (ref 12–44)
MCH RBC QN AUTO: 32 PG (ref 25–34)
MCHC RBC AUTO-ENTMCNC: 34 G/DL (ref 32–36)
MCV RBC AUTO: 94 FL (ref 80–99)
MONOCYTES # BLD AUTO: 0.5 X 10^3 (ref 0–1)
MONOCYTES NFR BLD AUTO: 8 % (ref 0–12)
NEUTROPHILS # BLD AUTO: 4.2 X 10^3 (ref 1.8–7.8)
NEUTROPHILS NFR BLD AUTO: 67 % (ref 42–75)
PLATELET # BLD: 213 10^3/UL (ref 130–400)
PMV BLD AUTO: 9.4 FL (ref 7.4–10.4)
POTASSIUM SERPL-SCNC: 4.6 MMOL/L (ref 3.6–5)
PROT SERPL-MCNC: 7.5 GM/DL (ref 6.4–8.2)
RBC # BLD AUTO: 4.23 10^6/UL (ref 4.35–5.85)
SODIUM SERPL-SCNC: 138 MMOL/L (ref 135–145)
TSH SERPL-ACNC: 2.07 UIU/ML (ref 0.35–4.94)
WBC # BLD AUTO: 6.2 10^3/UL (ref 4.3–11)

## 2017-07-20 PROCEDURE — 36415 COLL VENOUS BLD VENIPUNCTURE: CPT

## 2017-07-20 PROCEDURE — 80053 COMPREHEN METABOLIC PANEL: CPT

## 2017-07-20 PROCEDURE — 84443 ASSAY THYROID STIM HORMONE: CPT

## 2017-07-20 PROCEDURE — 99213 OFFICE O/P EST LOW 20 MIN: CPT

## 2017-07-20 PROCEDURE — 85025 COMPLETE CBC W/AUTO DIFF WBC: CPT

## 2017-09-01 ENCOUNTER — HOSPITAL ENCOUNTER (OUTPATIENT)
Dept: HOSPITAL 75 - LAB | Age: 67
End: 2017-09-01
Attending: PHYSICIAN ASSISTANT
Payer: MEDICARE

## 2017-09-01 DIAGNOSIS — E78.2: ICD-10-CM

## 2017-09-01 DIAGNOSIS — I10: Primary | ICD-10-CM

## 2017-09-01 LAB
ALBUMIN SERPL-MCNC: 4.3 GM/DL (ref 3.2–4.5)
ALT SERPL-CCNC: 14 U/L (ref 0–55)
ANION GAP SERPL CALC-SCNC: 11 MMOL/L (ref 5–14)
AST SERPL-CCNC: 16 U/L (ref 5–34)
BILIRUB SERPL-MCNC: 0.4 MG/DL (ref 0.1–1)
BUN SERPL-MCNC: 22 MG/DL (ref 7–18)
BUN/CREAT SERPL: 19
CALCIUM SERPL-MCNC: 9.4 MG/DL (ref 8.5–10.1)
CHLORIDE SERPL-SCNC: 102 MMOL/L (ref 98–107)
CHOLEST SERPL-MCNC: 169 MG/DL (ref ?–200)
CO2 SERPL-SCNC: 25 MMOL/L (ref 21–32)
CREAT SERPL-MCNC: 1.13 MG/DL (ref 0.6–1.3)
GFR SERPLBLD BASED ON 1.73 SQ M-ARVRAT: 48 ML/MIN
GLUCOSE SERPL-MCNC: 129 MG/DL (ref 70–105)
LDLC SERPL DIRECT ASSAY-MCNC: 110 MG/DL (ref 1–129)
POTASSIUM SERPL-SCNC: 4.6 MMOL/L (ref 3.6–5)
PROT SERPL-MCNC: 7.4 GM/DL (ref 6.4–8.2)
SODIUM SERPL-SCNC: 138 MMOL/L (ref 135–145)
TRIGL SERPL-MCNC: 115 MG/DL (ref ?–150)
VLDLC SERPL CALC-MCNC: 23 MG/DL (ref 5–40)

## 2017-09-01 PROCEDURE — 80053 COMPREHEN METABOLIC PANEL: CPT

## 2017-09-01 PROCEDURE — 80061 LIPID PANEL: CPT

## 2017-09-01 PROCEDURE — 36415 COLL VENOUS BLD VENIPUNCTURE: CPT

## 2017-11-02 ENCOUNTER — HOSPITAL ENCOUNTER (OUTPATIENT)
Dept: HOSPITAL 75 - ONC | Age: 67
LOS: 41 days | Discharge: HOME | End: 2017-12-13
Attending: INTERNAL MEDICINE
Payer: MEDICARE

## 2017-11-02 DIAGNOSIS — Z79.82: ICD-10-CM

## 2017-11-02 DIAGNOSIS — Z79.899: ICD-10-CM

## 2017-11-02 DIAGNOSIS — Z92.3: ICD-10-CM

## 2017-11-02 DIAGNOSIS — Z08: Primary | ICD-10-CM

## 2017-11-02 DIAGNOSIS — Z79.02: ICD-10-CM

## 2017-11-02 DIAGNOSIS — Z85.21: ICD-10-CM

## 2017-11-02 DIAGNOSIS — Z92.21: ICD-10-CM

## 2017-11-02 DIAGNOSIS — F17.210: ICD-10-CM

## 2017-11-02 DIAGNOSIS — I73.9: ICD-10-CM

## 2017-11-02 DIAGNOSIS — I25.10: ICD-10-CM

## 2017-11-02 DIAGNOSIS — Z95.5: ICD-10-CM

## 2017-11-02 LAB
ALBUMIN SERPL-MCNC: 4.5 GM/DL (ref 3.2–4.5)
ALT SERPL-CCNC: 16 U/L (ref 0–55)
ANION GAP SERPL CALC-SCNC: 9 MMOL/L (ref 5–14)
AST SERPL-CCNC: 17 U/L (ref 5–34)
BASOPHILS # BLD AUTO: 0 10^3/UL (ref 0–0.1)
BASOPHILS NFR BLD AUTO: 1 % (ref 0–10)
BILIRUB SERPL-MCNC: 0.5 MG/DL (ref 0.1–1)
BUN SERPL-MCNC: 19 MG/DL (ref 7–18)
BUN/CREAT SERPL: 19
CALCIUM SERPL-MCNC: 10.1 MG/DL (ref 8.5–10.1)
CHLORIDE SERPL-SCNC: 100 MMOL/L (ref 98–107)
CO2 SERPL-SCNC: 28 MMOL/L (ref 21–32)
CREAT SERPL-MCNC: 0.98 MG/DL (ref 0.6–1.3)
EOSINOPHIL # BLD AUTO: 0 10^3/UL (ref 0–0.3)
EOSINOPHIL NFR BLD AUTO: 1 % (ref 0–10)
ERYTHROCYTE [DISTWIDTH] IN BLOOD BY AUTOMATED COUNT: 13.1 % (ref 10–14.5)
GFR SERPLBLD BASED ON 1.73 SQ M-ARVRAT: 57 ML/MIN
GLUCOSE SERPL-MCNC: 114 MG/DL (ref 70–105)
LYMPHOCYTES # BLD AUTO: 1.7 X 10^3 (ref 1–4)
LYMPHOCYTES NFR BLD AUTO: 27 % (ref 12–44)
MCH RBC QN AUTO: 31 PG (ref 25–34)
MCHC RBC AUTO-ENTMCNC: 34 G/DL (ref 32–36)
MCV RBC AUTO: 93 FL (ref 80–99)
MONOCYTES # BLD AUTO: 0.3 X 10^3 (ref 0–1)
MONOCYTES NFR BLD AUTO: 5 % (ref 0–12)
NEUTROPHILS # BLD AUTO: 4.3 X 10^3 (ref 1.8–7.8)
NEUTROPHILS NFR BLD AUTO: 67 % (ref 42–75)
PLATELET # BLD: 234 10^3/UL (ref 130–400)
PMV BLD AUTO: 9.7 FL (ref 7.4–10.4)
POTASSIUM SERPL-SCNC: 4.7 MMOL/L (ref 3.6–5)
PROT SERPL-MCNC: 7.9 GM/DL (ref 6.4–8.2)
RBC # BLD AUTO: 4.39 10^6/UL (ref 4.35–5.85)
SODIUM SERPL-SCNC: 137 MMOL/L (ref 135–145)
TSH SERPL-ACNC: 1.52 UIU/ML (ref 0.35–4.94)
WBC # BLD AUTO: 6.3 10^3/UL (ref 4.3–11)

## 2017-11-02 PROCEDURE — 36415 COLL VENOUS BLD VENIPUNCTURE: CPT

## 2017-11-02 PROCEDURE — 85025 COMPLETE CBC W/AUTO DIFF WBC: CPT

## 2017-11-02 PROCEDURE — 84443 ASSAY THYROID STIM HORMONE: CPT

## 2017-11-02 PROCEDURE — 80053 COMPREHEN METABOLIC PANEL: CPT

## 2017-12-13 ENCOUNTER — HOSPITAL ENCOUNTER (OUTPATIENT)
Dept: HOSPITAL 75 - ONC | Age: 67
End: 2017-12-13
Attending: INTERNAL MEDICINE
Payer: MEDICARE

## 2017-12-13 DIAGNOSIS — C32.1: Primary | ICD-10-CM

## 2017-12-13 PROCEDURE — 99213 OFFICE O/P EST LOW 20 MIN: CPT

## 2017-12-15 ENCOUNTER — HOSPITAL ENCOUNTER (OUTPATIENT)
Dept: HOSPITAL 75 - LAB | Age: 67
End: 2017-12-15
Attending: PHYSICIAN ASSISTANT
Payer: MEDICARE

## 2017-12-15 ENCOUNTER — HOSPITAL ENCOUNTER (OUTPATIENT)
Dept: HOSPITAL 75 - LAB | Age: 67
End: 2017-12-15
Attending: NURSE PRACTITIONER
Payer: MEDICARE

## 2017-12-15 DIAGNOSIS — C32.1: Primary | ICD-10-CM

## 2017-12-15 DIAGNOSIS — E78.2: Primary | ICD-10-CM

## 2017-12-15 LAB
ALBUMIN SERPL-MCNC: 4.3 GM/DL (ref 3.2–4.5)
ALBUMIN SERPL-MCNC: 4.3 GM/DL (ref 3.2–4.5)
ALT SERPL-CCNC: 18 U/L (ref 0–55)
ALT SERPL-CCNC: 18 U/L (ref 0–55)
ANION GAP SERPL CALC-SCNC: 10 MMOL/L (ref 5–14)
AST SERPL-CCNC: 18 U/L (ref 5–34)
AST SERPL-CCNC: 18 U/L (ref 5–34)
BASOPHILS # BLD AUTO: 0 10^3/UL (ref 0–0.1)
BASOPHILS NFR BLD AUTO: 0 % (ref 0–10)
BILIRUB DIRECT SERPL-MCNC: 0.2 MG/DL (ref 0–0.3)
BILIRUB SERPL-MCNC: 0.4 MG/DL (ref 0.1–1)
BILIRUB SERPL-MCNC: 0.4 MG/DL (ref 0.1–1)
BUN SERPL-MCNC: 18 MG/DL (ref 7–18)
BUN/CREAT SERPL: 20
CALCIUM SERPL-MCNC: 9.2 MG/DL (ref 8.5–10.1)
CHLORIDE SERPL-SCNC: 104 MMOL/L (ref 98–107)
CHOLEST SERPL-MCNC: 162 MG/DL (ref ?–200)
CO2 SERPL-SCNC: 26 MMOL/L (ref 21–32)
CREAT SERPL-MCNC: 0.89 MG/DL (ref 0.6–1.3)
EOSINOPHIL # BLD AUTO: 0.1 10^3/UL (ref 0–0.3)
EOSINOPHIL NFR BLD AUTO: 2 % (ref 0–10)
ERYTHROCYTE [DISTWIDTH] IN BLOOD BY AUTOMATED COUNT: 13.1 % (ref 10–14.5)
GFR SERPLBLD BASED ON 1.73 SQ M-ARVRAT: > 60 ML/MIN
GLUCOSE SERPL-MCNC: 108 MG/DL (ref 70–105)
LDLC SERPL DIRECT ASSAY-MCNC: 98 MG/DL (ref 1–129)
LYMPHOCYTES # BLD AUTO: 1.7 X 10^3 (ref 1–4)
LYMPHOCYTES NFR BLD AUTO: 26 % (ref 12–44)
MCH RBC QN AUTO: 32 PG (ref 25–34)
MCHC RBC AUTO-ENTMCNC: 34 G/DL (ref 32–36)
MCV RBC AUTO: 95 FL (ref 80–99)
MONOCYTES # BLD AUTO: 0.4 X 10^3 (ref 0–1)
MONOCYTES NFR BLD AUTO: 6 % (ref 0–12)
NEUTROPHILS # BLD AUTO: 4.1 X 10^3 (ref 1.8–7.8)
NEUTROPHILS NFR BLD AUTO: 66 % (ref 42–75)
PLATELET # BLD: 217 10^3/UL (ref 130–400)
PMV BLD AUTO: 9.5 FL (ref 7.4–10.4)
POTASSIUM SERPL-SCNC: 4.3 MMOL/L (ref 3.6–5)
PROT SERPL-MCNC: 7.5 GM/DL (ref 6.4–8.2)
PROT SERPL-MCNC: 7.5 GM/DL (ref 6.4–8.2)
RBC # BLD AUTO: 4.24 10^6/UL (ref 4.35–5.85)
SODIUM SERPL-SCNC: 140 MMOL/L (ref 135–145)
TRIGL SERPL-MCNC: 130 MG/DL (ref ?–150)
TSH SERPL-ACNC: 2.35 UIU/ML (ref 0.35–4.94)
VLDLC SERPL CALC-MCNC: 26 MG/DL (ref 5–40)
WBC # BLD AUTO: 6.3 10^3/UL (ref 4.3–11)

## 2017-12-15 PROCEDURE — 36415 COLL VENOUS BLD VENIPUNCTURE: CPT

## 2017-12-15 PROCEDURE — 80053 COMPREHEN METABOLIC PANEL: CPT

## 2017-12-15 PROCEDURE — 80076 HEPATIC FUNCTION PANEL: CPT

## 2017-12-15 PROCEDURE — 80061 LIPID PANEL: CPT

## 2017-12-15 PROCEDURE — 84443 ASSAY THYROID STIM HORMONE: CPT

## 2017-12-15 PROCEDURE — 85025 COMPLETE CBC W/AUTO DIFF WBC: CPT

## 2018-01-04 ENCOUNTER — HOSPITAL ENCOUNTER (OUTPATIENT)
Dept: HOSPITAL 75 - RAD | Age: 68
End: 2018-01-04
Attending: OTOLARYNGOLOGY
Payer: MEDICARE

## 2018-01-04 DIAGNOSIS — K21.9: ICD-10-CM

## 2018-01-04 DIAGNOSIS — K44.9: Primary | ICD-10-CM

## 2018-01-04 PROCEDURE — 74220 X-RAY XM ESOPHAGUS 1CNTRST: CPT

## 2018-01-04 NOTE — DIAGNOSTIC IMAGING REPORT
EXAMINATION: Esophagus.



INDICATION: dysphagia



There are no prior studies available for comparison.



A double contrast study was performed.



The patient was able to swallow the contrast material without

difficulty. There is no laryngeal penetration or aspiration. The

esophagus shows fairly good distensibility and motility. There is

no mass or constricting lesion identified. There was a small

fixed hiatal hernia and there was mild reflux on one occasion.

There is no sign of esophagitis however.



A cursory examination of the stomach and proximal small bowel

shows no acute abnormality.



IMPRESSION:

1.  There is no evidence of an obstructive mass involving the

esophagus.

2. There is a small fixed hiatal hernia and there was mild

gastroesophageal reflux. There is no sign of esophagitis however.

3. The stomach and proximal small bowel are generally

unremarkable. 



Dictated by: 



  Dictated on workstation # [id number]

## 2018-06-13 ENCOUNTER — HOSPITAL ENCOUNTER (OUTPATIENT)
Dept: HOSPITAL 75 - ONC | Age: 68
End: 2018-06-13
Attending: INTERNAL MEDICINE
Payer: MEDICARE

## 2018-06-13 DIAGNOSIS — I10: ICD-10-CM

## 2018-06-13 DIAGNOSIS — Z08: Primary | ICD-10-CM

## 2018-06-13 DIAGNOSIS — Z92.21: ICD-10-CM

## 2018-06-13 DIAGNOSIS — Z85.21: ICD-10-CM

## 2018-06-13 DIAGNOSIS — Z79.899: ICD-10-CM

## 2018-06-13 DIAGNOSIS — I73.9: ICD-10-CM

## 2018-06-13 DIAGNOSIS — K21.9: ICD-10-CM

## 2018-06-13 DIAGNOSIS — Z95.5: ICD-10-CM

## 2018-06-13 DIAGNOSIS — Z79.82: ICD-10-CM

## 2018-06-13 DIAGNOSIS — F17.210: ICD-10-CM

## 2018-06-13 DIAGNOSIS — E78.2: ICD-10-CM

## 2018-06-13 DIAGNOSIS — K44.9: ICD-10-CM

## 2018-06-13 DIAGNOSIS — Z92.3: ICD-10-CM

## 2018-06-13 DIAGNOSIS — Z79.02: ICD-10-CM

## 2018-06-13 DIAGNOSIS — I25.10: ICD-10-CM

## 2018-06-13 LAB
ALBUMIN SERPL-MCNC: 4.3 GM/DL (ref 3.2–4.5)
ALP SERPL-CCNC: 72 U/L (ref 40–136)
ALT SERPL-CCNC: 18 U/L (ref 0–55)
BASOPHILS # BLD AUTO: 0 10^3/UL (ref 0–0.1)
BASOPHILS NFR BLD AUTO: 0 % (ref 0–10)
BILIRUB SERPL-MCNC: 0.5 MG/DL (ref 0.1–1)
BUN/CREAT SERPL: 17
CALCIUM SERPL-MCNC: 9 MG/DL (ref 8.5–10.1)
CHLORIDE SERPL-SCNC: 107 MMOL/L (ref 98–107)
CO2 SERPL-SCNC: 24 MMOL/L (ref 21–32)
CREAT SERPL-MCNC: 0.77 MG/DL (ref 0.6–1.3)
EOSINOPHIL # BLD AUTO: 0.1 10^3/UL (ref 0–0.3)
EOSINOPHIL NFR BLD AUTO: 2 % (ref 0–10)
ERYTHROCYTE [DISTWIDTH] IN BLOOD BY AUTOMATED COUNT: 13.1 % (ref 10–14.5)
GFR SERPLBLD BASED ON 1.73 SQ M-ARVRAT: > 60 ML/MIN
GLUCOSE SERPL-MCNC: 112 MG/DL (ref 70–105)
HCT VFR BLD CALC: 40 % (ref 35–52)
HGB BLD-MCNC: 13.8 G/DL (ref 11.5–16)
LYMPHOCYTES # BLD AUTO: 1.3 X 10^3 (ref 1–4)
LYMPHOCYTES NFR BLD AUTO: 24 % (ref 12–44)
MANUAL DIFFERENTIAL PERFORMED BLD QL: NO
MCH RBC QN AUTO: 32 PG (ref 25–34)
MCHC RBC AUTO-ENTMCNC: 34 G/DL (ref 32–36)
MCV RBC AUTO: 94 FL (ref 80–99)
MONOCYTES # BLD AUTO: 0.4 X 10^3 (ref 0–1)
MONOCYTES NFR BLD AUTO: 7 % (ref 0–12)
NEUTROPHILS # BLD AUTO: 3.7 X 10^3 (ref 1.8–7.8)
NEUTROPHILS NFR BLD AUTO: 67 % (ref 42–75)
PLATELET # BLD: 212 10^3/UL (ref 130–400)
PMV BLD AUTO: 9.4 FL (ref 7.4–10.4)
POTASSIUM SERPL-SCNC: 4.1 MMOL/L (ref 3.6–5)
PROT SERPL-MCNC: 7.3 GM/DL (ref 6.4–8.2)
RBC # BLD AUTO: 4.31 10^6/UL (ref 4.35–5.85)
SODIUM SERPL-SCNC: 140 MMOL/L (ref 135–145)
WBC # BLD AUTO: 5.5 10^3/UL (ref 4.3–11)

## 2018-06-13 PROCEDURE — 85025 COMPLETE CBC W/AUTO DIFF WBC: CPT

## 2018-06-13 PROCEDURE — 84443 ASSAY THYROID STIM HORMONE: CPT

## 2018-06-13 PROCEDURE — 99213 OFFICE O/P EST LOW 20 MIN: CPT

## 2018-06-13 PROCEDURE — 80053 COMPREHEN METABOLIC PANEL: CPT

## 2018-06-13 PROCEDURE — 36415 COLL VENOUS BLD VENIPUNCTURE: CPT

## 2018-10-02 ENCOUNTER — HOSPITAL ENCOUNTER (OUTPATIENT)
Dept: HOSPITAL 75 - ONC | Age: 68
End: 2018-10-02
Attending: INTERNAL MEDICINE
Payer: MEDICARE

## 2018-10-02 DIAGNOSIS — B37.0: Primary | ICD-10-CM

## 2018-10-02 PROCEDURE — 87101 SKIN FUNGI CULTURE: CPT

## 2019-04-04 ENCOUNTER — HOSPITAL ENCOUNTER (OUTPATIENT)
Dept: HOSPITAL 75 - LAB | Age: 69
End: 2019-04-04
Attending: INTERNAL MEDICINE
Payer: MEDICARE

## 2019-04-04 DIAGNOSIS — E78.2: ICD-10-CM

## 2019-04-04 DIAGNOSIS — I10: Primary | ICD-10-CM

## 2019-04-04 LAB
ALBUMIN SERPL-MCNC: 4.5 GM/DL (ref 3.2–4.5)
ALP SERPL-CCNC: 87 U/L (ref 40–136)
ALT SERPL-CCNC: 17 U/L (ref 0–55)
BILIRUB SERPL-MCNC: 0.5 MG/DL (ref 0.1–1)
BUN/CREAT SERPL: 19
CALCIUM SERPL-MCNC: 10 MG/DL (ref 8.5–10.1)
CHLORIDE SERPL-SCNC: 103 MMOL/L (ref 98–107)
CHOLEST SERPL-MCNC: 201 MG/DL (ref ?–200)
CO2 SERPL-SCNC: 27 MMOL/L (ref 21–32)
CREAT SERPL-MCNC: 0.84 MG/DL (ref 0.6–1.3)
GFR SERPLBLD BASED ON 1.73 SQ M-ARVRAT: > 60 ML/MIN
GLUCOSE SERPL-MCNC: 93 MG/DL (ref 70–105)
HDLC SERPL-MCNC: 41 MG/DL (ref 40–60)
POTASSIUM SERPL-SCNC: 4.1 MMOL/L (ref 3.6–5)
PROT SERPL-MCNC: 7.5 GM/DL (ref 6.4–8.2)
SODIUM SERPL-SCNC: 138 MMOL/L (ref 135–145)
TRIGL SERPL-MCNC: 182 MG/DL (ref ?–150)
VLDLC SERPL CALC-MCNC: 36 MG/DL (ref 5–40)

## 2019-04-04 PROCEDURE — 80061 LIPID PANEL: CPT

## 2019-04-04 PROCEDURE — 80053 COMPREHEN METABOLIC PANEL: CPT

## 2019-04-04 PROCEDURE — 36415 COLL VENOUS BLD VENIPUNCTURE: CPT

## 2019-10-30 ENCOUNTER — HOSPITAL ENCOUNTER (OUTPATIENT)
Dept: HOSPITAL 75 - RAD | Age: 69
End: 2019-10-30
Attending: OTOLARYNGOLOGY
Payer: MEDICARE

## 2019-10-30 DIAGNOSIS — I77.89: Primary | ICD-10-CM

## 2019-10-30 DIAGNOSIS — M50.223: ICD-10-CM

## 2019-10-30 DIAGNOSIS — M48.02: ICD-10-CM

## 2019-10-30 DIAGNOSIS — K13.70: ICD-10-CM

## 2019-10-30 LAB
BUN/CREAT SERPL: 19
CREAT SERPL-MCNC: 0.81 MG/DL (ref 0.6–1.3)
GFR SERPLBLD BASED ON 1.73 SQ M-ARVRAT: > 60 ML/MIN

## 2019-10-30 PROCEDURE — 70491 CT SOFT TISSUE NECK W/DYE: CPT

## 2019-10-30 PROCEDURE — 84520 ASSAY OF UREA NITROGEN: CPT

## 2019-10-30 PROCEDURE — 82565 ASSAY OF CREATININE: CPT

## 2019-10-30 PROCEDURE — 36415 COLL VENOUS BLD VENIPUNCTURE: CPT

## 2019-10-30 NOTE — DIAGNOSTIC IMAGING REPORT
PROCEDURE: CT neck soft tissue with contrast.



TECHNIQUE: Multiple contiguous axial images were obtained through

the neck after the administration of contrast. Auto Exposure

Controls were utilized during the CT exam to meet ALARA standards

for radiation dose reduction. 



INDICATION:  Left oral cavity lesion.



COMPARISON:  Comparison is made to study of 11/06/2017.



FINDINGS:  The visualized intracranial structures reveal no

evidence of mass or hemorrhage. There is atherosclerotic

calcification within the distal internal carotid arteries,

bilaterally. Nasopharynx and oropharynx are unremarkable although

there is an asymmetric 2.8 x 1.1 cm focus of abnormal increased

density extending from the mentum along the left mandibular body

in the floor of the mouth. This does result in mild displacement

of the tongue musculature. No pathologic adenopathy is

identified. Left submandibular lymph nodes measure up to 1 cm in

long axis. Otherwise there is no evidence of pathologic

adenopathy in the neck. No definite laryngeal mass or asymmetry

is identified. There is dense atherosclerotic calcification at

the carotid bulbs and at the origin of the internal carotid

arteries resulting in at least 50% stenoses. There is diffuse

degenerative disc disease with focal protrusion of the C6-C7 disc

resulting in mild-to-moderate spinal stenosis.



Note is made of dilatation of the left subclavian vein which may

be due to prominent venous valves.



IMPRESSION: Asymmetric density in the floor of the left mouth

along the lingual gutter could be related to lingual salivary

gland although neoplasm is not excluded. No definite pathologic

adenopathy is seen in the neck with subcentimeter left deep

cervical lymph nodes.



Note is made of extensive carotid atherosclerotic disease and

focal stenoses at the origins of internal carotid arteries with

focal protrusion of C6-C7 disc resulting in spinal stenosis.

Clinical correlation is recommended.



Dictated by: 



  Dictated on workstation # [id number]

## 2020-01-22 ENCOUNTER — HOSPITAL ENCOUNTER (OUTPATIENT)
Dept: HOSPITAL 75 - LAB | Age: 70
End: 2020-01-22
Attending: INTERNAL MEDICINE
Payer: MEDICARE

## 2020-01-22 DIAGNOSIS — I73.9: ICD-10-CM

## 2020-01-22 DIAGNOSIS — E78.2: Primary | ICD-10-CM

## 2020-01-22 DIAGNOSIS — I10: ICD-10-CM

## 2020-01-22 LAB
ALBUMIN SERPL-MCNC: 3.9 GM/DL (ref 3.2–4.5)
ALBUMIN SERPL-MCNC: 4 GM/DL (ref 3.2–4.5)
ALP SERPL-CCNC: 81 U/L (ref 40–136)
ALP SERPL-CCNC: 85 U/L (ref 40–136)
ALT SERPL-CCNC: 22 U/L (ref 0–55)
ALT SERPL-CCNC: 22 U/L (ref 0–55)
BASOPHILS # BLD AUTO: 0 10^3/UL (ref 0–0.1)
BASOPHILS NFR BLD AUTO: 0 % (ref 0–10)
BILIRUB DIRECT SERPL-MCNC: 0.2 MG/DL (ref 0–0.3)
BILIRUB SERPL-MCNC: 0.4 MG/DL (ref 0.1–1)
BILIRUB SERPL-MCNC: 0.4 MG/DL (ref 0.1–1)
BUN/CREAT SERPL: 16
CALCIUM SERPL-MCNC: 9.2 MG/DL (ref 8.5–10.1)
CHLORIDE SERPL-SCNC: 106 MMOL/L (ref 98–107)
CHOLEST SERPL-MCNC: 134 MG/DL (ref ?–200)
CO2 SERPL-SCNC: 24 MMOL/L (ref 21–32)
CREAT SERPL-MCNC: 0.83 MG/DL (ref 0.6–1.3)
EOSINOPHIL # BLD AUTO: 0.1 10^3/UL (ref 0–0.3)
EOSINOPHIL NFR BLD AUTO: 2 % (ref 0–10)
ERYTHROCYTE [DISTWIDTH] IN BLOOD BY AUTOMATED COUNT: 13 % (ref 10–14.5)
GFR SERPLBLD BASED ON 1.73 SQ M-ARVRAT: > 60 ML/MIN
GLUCOSE SERPL-MCNC: 105 MG/DL (ref 70–105)
HCT VFR BLD CALC: 35 % (ref 35–52)
HDLC SERPL-MCNC: 38 MG/DL (ref 40–60)
HGB BLD-MCNC: 11.5 G/DL (ref 11.5–16)
LYMPHOCYTES # BLD AUTO: 1.5 X 10^3 (ref 1–4)
LYMPHOCYTES NFR BLD AUTO: 25 % (ref 12–44)
MANUAL DIFFERENTIAL PERFORMED BLD QL: NO
MCH RBC QN AUTO: 30 PG (ref 25–34)
MCHC RBC AUTO-ENTMCNC: 33 G/DL (ref 32–36)
MCV RBC AUTO: 93 FL (ref 80–99)
MONOCYTES # BLD AUTO: 0.5 X 10^3 (ref 0–1)
MONOCYTES NFR BLD AUTO: 9 % (ref 0–12)
NEUTROPHILS # BLD AUTO: 3.7 X 10^3 (ref 1.8–7.8)
NEUTROPHILS NFR BLD AUTO: 64 % (ref 42–75)
PLATELET # BLD: 219 10^3/UL (ref 130–400)
PMV BLD AUTO: 9.4 FL (ref 7.4–10.4)
POTASSIUM SERPL-SCNC: 3.9 MMOL/L (ref 3.6–5)
PROT SERPL-MCNC: 6.8 GM/DL (ref 6.4–8.2)
PROT SERPL-MCNC: 6.8 GM/DL (ref 6.4–8.2)
SODIUM SERPL-SCNC: 141 MMOL/L (ref 135–145)
TRIGL SERPL-MCNC: 74 MG/DL (ref ?–150)
VLDLC SERPL CALC-MCNC: 15 MG/DL (ref 5–40)
WBC # BLD AUTO: 5.9 10^3/UL (ref 4.3–11)

## 2020-01-22 PROCEDURE — 80061 LIPID PANEL: CPT

## 2020-01-22 PROCEDURE — 80076 HEPATIC FUNCTION PANEL: CPT

## 2020-01-22 PROCEDURE — 36415 COLL VENOUS BLD VENIPUNCTURE: CPT

## 2020-01-27 LAB
APTT PPP: YELLOW S
BACTERIA #/AREA URNS HPF: (no result) /HPF
BILIRUB UR QL STRIP: NEGATIVE
FIBRINOGEN PPP-MCNC: CLEAR MG/DL
GLUCOSE UR STRIP-MCNC: NEGATIVE MG/DL
KETONES UR QL STRIP: NEGATIVE
LEUKOCYTE ESTERASE UR QL STRIP: (no result)
NITRITE UR QL STRIP: NEGATIVE
PH UR STRIP: 7 [PH] (ref 5–9)
PROT UR QL STRIP: NEGATIVE
RBC #/AREA URNS HPF: (no result) /HPF
SP GR UR STRIP: 1.01 (ref 1.02–1.02)
SQUAMOUS #/AREA URNS HPF: (no result) /HPF
WBC #/AREA URNS HPF: >100 /HPF

## 2020-02-24 LAB
ALBUMIN SERPL-MCNC: 4 GM/DL (ref 3.2–4.5)
ALP SERPL-CCNC: 105 U/L (ref 40–136)
ALT SERPL-CCNC: 21 U/L (ref 0–55)
BASOPHILS # BLD AUTO: 0 10^3/UL (ref 0–0.1)
BASOPHILS NFR BLD AUTO: 0 % (ref 0–10)
BILIRUB SERPL-MCNC: 0.4 MG/DL (ref 0.1–1)
BUN/CREAT SERPL: 16
CALCIUM SERPL-MCNC: 10 MG/DL (ref 8.5–10.1)
CHLORIDE SERPL-SCNC: 99 MMOL/L (ref 98–107)
CO2 SERPL-SCNC: 26 MMOL/L (ref 21–32)
CREAT SERPL-MCNC: 0.95 MG/DL (ref 0.6–1.3)
EOSINOPHIL # BLD AUTO: 0.1 10^3/UL (ref 0–0.3)
EOSINOPHIL NFR BLD AUTO: 1 % (ref 0–10)
ERYTHROCYTE [DISTWIDTH] IN BLOOD BY AUTOMATED COUNT: 12.7 % (ref 10–14.5)
GFR SERPLBLD BASED ON 1.73 SQ M-ARVRAT: 58 ML/MIN
GLUCOSE SERPL-MCNC: 130 MG/DL (ref 70–105)
HCT VFR BLD CALC: 36 % (ref 35–52)
HGB BLD-MCNC: 11.6 G/DL (ref 11.5–16)
LYMPHOCYTES # BLD AUTO: 1.1 X 10^3 (ref 1–4)
LYMPHOCYTES NFR BLD AUTO: 17 % (ref 12–44)
MAGNESIUM SERPL-MCNC: 2 MG/DL (ref 1.6–2.4)
MANUAL DIFFERENTIAL PERFORMED BLD QL: NO
MCH RBC QN AUTO: 29 PG (ref 25–34)
MCHC RBC AUTO-ENTMCNC: 33 G/DL (ref 32–36)
MCV RBC AUTO: 90 FL (ref 80–99)
MONOCYTES # BLD AUTO: 0.5 X 10^3 (ref 0–1)
MONOCYTES NFR BLD AUTO: 8 % (ref 0–12)
NEUTROPHILS # BLD AUTO: 4.8 X 10^3 (ref 1.8–7.8)
NEUTROPHILS NFR BLD AUTO: 73 % (ref 42–75)
PLATELET # BLD: 253 10^3/UL (ref 130–400)
PMV BLD AUTO: 9.2 FL (ref 7.4–10.4)
POTASSIUM SERPL-SCNC: 3.9 MMOL/L (ref 3.6–5)
PROT SERPL-MCNC: 7.3 GM/DL (ref 6.4–8.2)
SODIUM SERPL-SCNC: 135 MMOL/L (ref 135–145)
WBC # BLD AUTO: 6.5 10^3/UL (ref 4.3–11)

## 2020-03-02 LAB
BASOPHILS # BLD AUTO: 0 10^3/UL (ref 0–0.1)
BASOPHILS NFR BLD AUTO: 0 % (ref 0–10)
BUN/CREAT SERPL: 14
CALCIUM SERPL-MCNC: 9.5 MG/DL (ref 8.5–10.1)
CHLORIDE SERPL-SCNC: 101 MMOL/L (ref 98–107)
CO2 SERPL-SCNC: 29 MMOL/L (ref 21–32)
CREAT SERPL-MCNC: 0.87 MG/DL (ref 0.6–1.3)
EOSINOPHIL # BLD AUTO: 0.1 10^3/UL (ref 0–0.3)
EOSINOPHIL NFR BLD AUTO: 1 % (ref 0–10)
ERYTHROCYTE [DISTWIDTH] IN BLOOD BY AUTOMATED COUNT: 12.9 % (ref 10–14.5)
GFR SERPLBLD BASED ON 1.73 SQ M-ARVRAT: > 60 ML/MIN
GLUCOSE SERPL-MCNC: 99 MG/DL (ref 70–105)
HCT VFR BLD CALC: 37 % (ref 35–52)
HGB BLD-MCNC: 11.9 G/DL (ref 11.5–16)
LYMPHOCYTES # BLD AUTO: 1.2 X 10^3 (ref 1–4)
LYMPHOCYTES NFR BLD AUTO: 16 % (ref 12–44)
MAGNESIUM SERPL-MCNC: 2.3 MG/DL (ref 1.6–2.4)
MANUAL DIFFERENTIAL PERFORMED BLD QL: NO
MCH RBC QN AUTO: 29 PG (ref 25–34)
MCHC RBC AUTO-ENTMCNC: 32 G/DL (ref 32–36)
MCV RBC AUTO: 91 FL (ref 80–99)
MONOCYTES # BLD AUTO: 0.8 X 10^3 (ref 0–1)
MONOCYTES NFR BLD AUTO: 11 % (ref 0–12)
NEUTROPHILS # BLD AUTO: 5.5 X 10^3 (ref 1.8–7.8)
NEUTROPHILS NFR BLD AUTO: 72 % (ref 42–75)
PLATELET # BLD: 239 10^3/UL (ref 130–400)
PMV BLD AUTO: 8.9 FL (ref 7.4–10.4)
POTASSIUM SERPL-SCNC: 4.1 MMOL/L (ref 3.6–5)
SODIUM SERPL-SCNC: 138 MMOL/L (ref 135–145)
WBC # BLD AUTO: 7.7 10^3/UL (ref 4.3–11)

## 2020-03-09 LAB
ALBUMIN SERPL-MCNC: 3.8 GM/DL (ref 3.2–4.5)
ALP SERPL-CCNC: 95 U/L (ref 40–136)
ALT SERPL-CCNC: 16 U/L (ref 0–55)
BASOPHILS # BLD AUTO: 0 10^3/UL (ref 0–0.1)
BASOPHILS NFR BLD AUTO: 0 % (ref 0–10)
BILIRUB SERPL-MCNC: 0.4 MG/DL (ref 0.1–1)
BUN/CREAT SERPL: 16
CALCIUM SERPL-MCNC: 9.6 MG/DL (ref 8.5–10.1)
CHLORIDE SERPL-SCNC: 99 MMOL/L (ref 98–107)
CO2 SERPL-SCNC: 27 MMOL/L (ref 21–32)
CREAT SERPL-MCNC: 0.76 MG/DL (ref 0.6–1.3)
EOSINOPHIL # BLD AUTO: 0.1 10^3/UL (ref 0–0.3)
EOSINOPHIL NFR BLD AUTO: 1 % (ref 0–10)
ERYTHROCYTE [DISTWIDTH] IN BLOOD BY AUTOMATED COUNT: 12.8 % (ref 10–14.5)
GFR SERPLBLD BASED ON 1.73 SQ M-ARVRAT: > 60 ML/MIN
GLUCOSE SERPL-MCNC: 107 MG/DL (ref 70–105)
HCT VFR BLD CALC: 34 % (ref 35–52)
HGB BLD-MCNC: 11 G/DL (ref 11.5–16)
LYMPHOCYTES # BLD AUTO: 1 X 10^3 (ref 1–4)
LYMPHOCYTES NFR BLD AUTO: 14 % (ref 12–44)
MAGNESIUM SERPL-MCNC: 2.1 MG/DL (ref 1.6–2.4)
MANUAL DIFFERENTIAL PERFORMED BLD QL: NO
MCH RBC QN AUTO: 30 PG (ref 25–34)
MCHC RBC AUTO-ENTMCNC: 33 G/DL (ref 32–36)
MCV RBC AUTO: 92 FL (ref 80–99)
MONOCYTES # BLD AUTO: 0.7 X 10^3 (ref 0–1)
MONOCYTES NFR BLD AUTO: 9 % (ref 0–12)
NEUTROPHILS # BLD AUTO: 5.9 X 10^3 (ref 1.8–7.8)
NEUTROPHILS NFR BLD AUTO: 77 % (ref 42–75)
PLATELET # BLD: 248 10^3/UL (ref 130–400)
PMV BLD AUTO: 8.7 FL (ref 7.4–10.4)
POTASSIUM SERPL-SCNC: 4.4 MMOL/L (ref 3.6–5)
PROT SERPL-MCNC: 7.1 GM/DL (ref 6.4–8.2)
SODIUM SERPL-SCNC: 134 MMOL/L (ref 135–145)
WBC # BLD AUTO: 7.6 10^3/UL (ref 4.3–11)

## 2020-03-12 ENCOUNTER — HOSPITAL ENCOUNTER (OUTPATIENT)
Dept: HOSPITAL 75 - RAD | Age: 70
End: 2020-03-12
Attending: OTOLARYNGOLOGY
Payer: MEDICARE

## 2020-03-12 DIAGNOSIS — R59.0: ICD-10-CM

## 2020-03-12 DIAGNOSIS — S02.69XA: ICD-10-CM

## 2020-03-12 DIAGNOSIS — C14.8: Primary | ICD-10-CM

## 2020-03-12 PROCEDURE — 70491 CT SOFT TISSUE NECK W/DYE: CPT

## 2020-03-16 LAB
BASOPHILS # BLD AUTO: 0 10^3/UL (ref 0–0.1)
BASOPHILS NFR BLD AUTO: 0 % (ref 0–10)
BUN/CREAT SERPL: 18
CALCIUM SERPL-MCNC: 9.6 MG/DL (ref 8.5–10.1)
CHLORIDE SERPL-SCNC: 97 MMOL/L (ref 98–107)
CO2 SERPL-SCNC: 27 MMOL/L (ref 21–32)
CREAT SERPL-MCNC: 0.73 MG/DL (ref 0.6–1.3)
EOSINOPHIL # BLD AUTO: 0 10^3/UL (ref 0–0.3)
EOSINOPHIL NFR BLD AUTO: 0 % (ref 0–10)
ERYTHROCYTE [DISTWIDTH] IN BLOOD BY AUTOMATED COUNT: 13.4 % (ref 10–14.5)
GFR SERPLBLD BASED ON 1.73 SQ M-ARVRAT: > 60 ML/MIN
GLUCOSE SERPL-MCNC: 99 MG/DL (ref 70–105)
HCT VFR BLD CALC: 35 % (ref 35–52)
HGB BLD-MCNC: 11.6 G/DL (ref 11.5–16)
LYMPHOCYTES # BLD AUTO: 1.1 X 10^3 (ref 1–4)
LYMPHOCYTES NFR BLD AUTO: 15 % (ref 12–44)
MAGNESIUM SERPL-MCNC: 2.1 MG/DL (ref 1.6–2.4)
MANUAL DIFFERENTIAL PERFORMED BLD QL: NO
MCH RBC QN AUTO: 30 PG (ref 25–34)
MCHC RBC AUTO-ENTMCNC: 33 G/DL (ref 32–36)
MCV RBC AUTO: 91 FL (ref 80–99)
MONOCYTES # BLD AUTO: 0.7 X 10^3 (ref 0–1)
MONOCYTES NFR BLD AUTO: 10 % (ref 0–12)
NEUTROPHILS # BLD AUTO: 5.2 X 10^3 (ref 1.8–7.8)
NEUTROPHILS NFR BLD AUTO: 74 % (ref 42–75)
PLATELET # BLD: 277 10^3/UL (ref 130–400)
PMV BLD AUTO: 8.8 FL (ref 7.4–10.4)
POTASSIUM SERPL-SCNC: 4.1 MMOL/L (ref 3.6–5)
SODIUM SERPL-SCNC: 134 MMOL/L (ref 135–145)
WBC # BLD AUTO: 7 10^3/UL (ref 4.3–11)

## 2020-03-23 LAB
ALBUMIN SERPL-MCNC: 3.6 GM/DL (ref 3.2–4.5)
ALP SERPL-CCNC: 91 U/L (ref 40–136)
ALT SERPL-CCNC: 18 U/L (ref 0–55)
BASOPHILS # BLD AUTO: 0 10^3/UL (ref 0–0.1)
BASOPHILS NFR BLD AUTO: 0 % (ref 0–10)
BILIRUB SERPL-MCNC: 0.3 MG/DL (ref 0.1–1)
BUN/CREAT SERPL: 15
CALCIUM SERPL-MCNC: 9.8 MG/DL (ref 8.5–10.1)
CHLORIDE SERPL-SCNC: 96 MMOL/L (ref 98–107)
CO2 SERPL-SCNC: 27 MMOL/L (ref 21–32)
CREAT SERPL-MCNC: 0.68 MG/DL (ref 0.6–1.3)
EOSINOPHIL # BLD AUTO: 0 10^3/UL (ref 0–0.3)
EOSINOPHIL NFR BLD AUTO: 0 % (ref 0–10)
ERYTHROCYTE [DISTWIDTH] IN BLOOD BY AUTOMATED COUNT: 14 % (ref 10–14.5)
GFR SERPLBLD BASED ON 1.73 SQ M-ARVRAT: > 60 ML/MIN
GLUCOSE SERPL-MCNC: 112 MG/DL (ref 70–105)
HCT VFR BLD CALC: 32 % (ref 35–52)
HGB BLD-MCNC: 11 G/DL (ref 11.5–16)
LYMPHOCYTES # BLD AUTO: 0.9 X 10^3 (ref 1–4)
LYMPHOCYTES NFR BLD AUTO: 10 % (ref 12–44)
MAGNESIUM SERPL-MCNC: 2.1 MG/DL (ref 1.6–2.4)
MANUAL DIFFERENTIAL PERFORMED BLD QL: NO
MCH RBC QN AUTO: 31 PG (ref 25–34)
MCHC RBC AUTO-ENTMCNC: 34 G/DL (ref 32–36)
MCV RBC AUTO: 91 FL (ref 80–99)
MONOCYTES # BLD AUTO: 0.7 X 10^3 (ref 0–1)
MONOCYTES NFR BLD AUTO: 8 % (ref 0–12)
NEUTROPHILS # BLD AUTO: 7.2 X 10^3 (ref 1.8–7.8)
NEUTROPHILS NFR BLD AUTO: 82 % (ref 42–75)
PLATELET # BLD: 230 10^3/UL (ref 130–400)
PMV BLD AUTO: 8.6 FL (ref 7.4–10.4)
POTASSIUM SERPL-SCNC: 4.4 MMOL/L (ref 3.6–5)
PROT SERPL-MCNC: 7 GM/DL (ref 6.4–8.2)
SODIUM SERPL-SCNC: 132 MMOL/L (ref 135–145)
WBC # BLD AUTO: 8.7 10^3/UL (ref 4.3–11)

## 2020-03-30 ENCOUNTER — HOSPITAL ENCOUNTER (EMERGENCY)
Dept: HOSPITAL 75 - ER | Age: 70
Discharge: HOME | End: 2020-03-30
Payer: MEDICARE

## 2020-03-30 VITALS — SYSTOLIC BLOOD PRESSURE: 137 MMHG | DIASTOLIC BLOOD PRESSURE: 60 MMHG

## 2020-03-30 VITALS — BODY MASS INDEX: 19.83 KG/M2 | HEIGHT: 64.96 IN | WEIGHT: 119.05 LBS

## 2020-03-30 DIAGNOSIS — Z79.82: ICD-10-CM

## 2020-03-30 DIAGNOSIS — K21.9: ICD-10-CM

## 2020-03-30 DIAGNOSIS — I25.10: ICD-10-CM

## 2020-03-30 DIAGNOSIS — C14.0: ICD-10-CM

## 2020-03-30 DIAGNOSIS — G89.3: Primary | ICD-10-CM

## 2020-03-30 DIAGNOSIS — C15.9: ICD-10-CM

## 2020-03-30 DIAGNOSIS — Z87.891: ICD-10-CM

## 2020-03-30 DIAGNOSIS — I10: ICD-10-CM

## 2020-03-30 DIAGNOSIS — R68.84: ICD-10-CM

## 2020-03-30 DIAGNOSIS — Z88.5: ICD-10-CM

## 2020-03-30 DIAGNOSIS — Z79.02: ICD-10-CM

## 2020-03-30 DIAGNOSIS — Z95.5: ICD-10-CM

## 2020-03-30 LAB
ALBUMIN SERPL-MCNC: 3.6 GM/DL (ref 3.2–4.5)
ALP SERPL-CCNC: 86 U/L (ref 40–136)
ALT SERPL-CCNC: 17 U/L (ref 0–55)
BASOPHILS # BLD AUTO: 0 10^3/UL (ref 0–0.1)
BASOPHILS NFR BLD AUTO: 0 % (ref 0–10)
BASOPHILS NFR BLD MANUAL: 0 %
BILIRUB SERPL-MCNC: 0.4 MG/DL (ref 0.1–1)
BUN/CREAT SERPL: 22
CALCIUM SERPL-MCNC: 9.8 MG/DL (ref 8.5–10.1)
CHLORIDE SERPL-SCNC: 93 MMOL/L (ref 98–107)
CO2 SERPL-SCNC: 30 MMOL/L (ref 21–32)
CREAT SERPL-MCNC: 0.64 MG/DL (ref 0.6–1.3)
EOSINOPHIL # BLD AUTO: 0 10^3/UL (ref 0–0.3)
EOSINOPHIL NFR BLD AUTO: 0 % (ref 0–10)
EOSINOPHIL NFR BLD MANUAL: 0 %
ERYTHROCYTE [DISTWIDTH] IN BLOOD BY AUTOMATED COUNT: 14.9 % (ref 10–14.5)
GFR SERPLBLD BASED ON 1.73 SQ M-ARVRAT: > 60 ML/MIN
GLUCOSE SERPL-MCNC: 107 MG/DL (ref 70–105)
HCT VFR BLD CALC: 34 % (ref 35–52)
HGB BLD-MCNC: 11.1 G/DL (ref 11.5–16)
LYMPHOCYTES # BLD AUTO: 0.7 X 10^3 (ref 1–4)
LYMPHOCYTES NFR BLD AUTO: 8 % (ref 12–44)
MANUAL DIFFERENTIAL PERFORMED BLD QL: YES
MCH RBC QN AUTO: 30 PG (ref 25–34)
MCHC RBC AUTO-ENTMCNC: 33 G/DL (ref 32–36)
MCV RBC AUTO: 92 FL (ref 80–99)
MONOCYTES # BLD AUTO: 1 X 10^3 (ref 0–1)
MONOCYTES NFR BLD AUTO: 11 % (ref 0–12)
MONOCYTES NFR BLD: 8 %
NEUTROPHILS # BLD AUTO: 7 X 10^3 (ref 1.8–7.8)
NEUTROPHILS NFR BLD AUTO: 80 % (ref 42–75)
NEUTS BAND NFR BLD MANUAL: 80 %
NEUTS BAND NFR BLD: 3 %
PLATELET # BLD: 268 10^3/UL (ref 130–400)
PMV BLD AUTO: 8.7 FL (ref 7.4–10.4)
POTASSIUM SERPL-SCNC: 4.1 MMOL/L (ref 3.6–5)
PROT SERPL-MCNC: 7.1 GM/DL (ref 6.4–8.2)
RBC MORPH BLD: NORMAL
SODIUM SERPL-SCNC: 133 MMOL/L (ref 135–145)
VARIANT LYMPHS NFR BLD MANUAL: 9 %
WBC # BLD AUTO: 8.7 10^3/UL (ref 4.3–11)

## 2020-03-30 PROCEDURE — 36415 COLL VENOUS BLD VENIPUNCTURE: CPT

## 2020-03-30 PROCEDURE — 85007 BL SMEAR W/DIFF WBC COUNT: CPT

## 2020-03-30 PROCEDURE — 71045 X-RAY EXAM CHEST 1 VIEW: CPT

## 2020-03-30 PROCEDURE — 86141 C-REACTIVE PROTEIN HS: CPT

## 2020-03-30 PROCEDURE — 85027 COMPLETE CBC AUTOMATED: CPT

## 2020-03-30 PROCEDURE — 80053 COMPREHEN METABOLIC PANEL: CPT

## 2020-03-30 NOTE — ED GENERAL
General


Chief Complaint:  General Problems/Pain


Stated Complaint:  FALL


Source of Information:  Patient


Exam Limitations:  No Limitations





History of Present Illness


Date Seen by Provider:  Mar 30, 2020


Time Seen by Provider:  09:30


Initial Comments


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

She apparently has a broken jaw from the cancer. She did have a fall this 

morning in which she relates that she lost her balance and Hit her left shoulder

but is not having any pain related to that. Denies head injury or hitting her 

head. Her only concern is that she is dehydrated and needs fluids and that she 

needs something for the pain. She has fentanyl patch on for a total of 50 g and

those are due to be changed today. She takes breakthrough pain medicine 

hydrocodone that she puts into the G-tube. She states that she is not out of 

that. She has not had any of that since 2 AM. She was trying to get a hold of 

the cancer center and follows with [facility].


Timing/Duration:  1-3 Hours


Severity:  Moderate


Associated Systoms:  Cough (chronic); No Fever/Chills, No Headaches, No 

Nausea/Vomiting; Weakness





Allergies and Home Medications


Allergies


Coded Allergies:  


     codeine (Unverified  Allergy, Unknown, NAUSEA, 9/30/14)





Home Medications


Aspirin 81 Mg Tab.chew, 81 MG PO HS, (Reported)


Atorvastatin Calcium 10 Mg Tablet, 10 MG PO HS, (Reported)


Clopidogrel Bisulfate 75 Mg Tablet, 75 MG PO HS, (Reported)


Lidocaine 700 Mg Adh..patch, 1 PATCH TP DAILY PRN for PAIN, (Reported)


Multivitamin/Iron/Folic Acid 1 Each Tablet, 1 TAB PO Q48H, (Reported)


   TAKES AT BEDTIME 


Pantoprazole Sodium 40 Mg Tablet.dr, 40 MG PO HS, (Reported)


Prochlorperazine Maleate 10 Mg Tablet, 10 MG PO Q6H PRN for nausea


   Prescribed by: SHWETA OZUNA on 1/31/17 0839





Patient Home Medication List


Home Medication List Reviewed:  Yes





Review of Systems


Review of Systems


Constitutional:  see HPI; No chills, No fever


EENTM:  no symptoms reported


Respiratory:  cough; No wheezing


Cardiovascular:  no symptoms reported


Gastrointestinal:  no symptoms reported, see HPI


Genitourinary:  no symptoms reported


Musculoskeletal:  see HPI; No back pain; joint pain, muscle pain; No neck pain


Skin:  no symptoms reported





All Other Systems Reviewed


Negative Unless Noted:  Yes





Past Medical-Social-Family Hx


Past Med/Social Hx:  Reviewed Nursing Past Med/Soc Hx


Patient Social History


Alcohol Use:  Denies Use


Recreational Drug Use:  No


Smoking Status:  Former Smoker


Type Used:  Cigarettes


Former Smoker, Quit:  Jul 24, 2016


2nd Hand Smoke Exposure:  Yes


Recent Hopitalizations:  No





Immunizations Up To Date


Tetanus Booster (TDap):  Unknown


Date of Influenza Vaccine:  Sep 6, 2015





Past Medical History


Surgeries:  Yes


Abdominal, Coronary Stent


Respiratory:  No


Cardiac:  Yes


Coronary Artery Disease, Hypertension


Neurological:  No


Gastrointestinal:  Yes


Gastroesophageal Reflux


Musculoskeletal:  No


Endocrine:  No


Cancer:  Yes (THROAT)


Did You Recieve Any Treatments:  Yes


What Type of Treatment Did You:  Chemotherapy


Blood Disorders:  No





Family Medical History


Reviewed Nursing Family Hx


No Pertinent Family Hx





Physical Exam


Vital Signs





Vital Signs - First Documented








 3/30/20





 09:29


 


Temp 37.2


 


Pulse 95


 


Resp 18


 


B/P (MAP) 142/80 (100)


 


Pulse Ox 93


 


O2 Delivery Room Air





Capillary Refill :


Height, Weight, BMI


Height: 5'6.00"


Weight: 171lbs. 0oz. [id number]; 25.5 BMI


Method:Stated


General Appearance:  No Apparent Distress, WD/WN


HEENT:  PERRL/EOMI, Pharynx Normal, Other (no obvious head injury. Significant 

fullness into the lower jaw and upper neck area where her cancer is.)


Neck:  Non Tender, Supple


Respiratory:  Normal Breath Sounds, Accessory Muscle Use, Crackles


Cardiovascular:  Regular Rate, Rhythm


Gastrointestinal:  Non Tender, Soft


Extremity:  Normal Range of Motion, Non Tender, Other (no pain or range of 

motion limitations to the left shoulder)


Neurologic/Psychiatric:  Alert, Oriented x3


Skin:  Normal Color, Warm/Dry





Progress/Results/Core Measures


Suspected Sepsis


SIRS


Temperature: 


Pulse:  


Respiratory Rate: 


 


Laboratory Tests


3/30/20 09:45: White Blood Count 8.7


Blood Pressure  / 


Mean: 


 





Laboratory Tests


3/30/20 09:45: 


Creatinine 0.64, Platelet Count 268, Total Bilirubin 0.4








Results/Orders


Lab Results





Laboratory Tests








Test


 3/30/20


09:45 Range/Units


 


 


White Blood Count


 8.7 


 4.3-11.0


10^3/uL


 


Red Blood Count


 3.66 L


 4.35-5.85


10^6/uL


 


Hemoglobin 11.1 L 11.5-16.0  G/DL


 


Hematocrit 34 L 35-52  %


 


Mean Corpuscular Volume 92  80-99  FL


 


Mean Corpuscular Hemoglobin 30  25-34  PG


 


Mean Corpuscular Hemoglobin


Concent 33 


 32-36  G/DL





 


Red Cell Distribution Width 14.9 H 10.0-14.5  %


 


Platelet Count


 268 


 130-400


10^3/uL


 


Mean Platelet Volume 8.7  7.4-10.4  FL


 


Neutrophils (%) (Auto) 80 H 42-75  %


 


Lymphocytes (%) (Auto) 8 L 12-44  %


 


Monocytes (%) (Auto) 11  0-12  %


 


Eosinophils (%) (Auto) 0  0-10  %


 


Basophils (%) (Auto) 0  0-10  %


 


Neutrophils # (Auto) 7.0  1.8-7.8  X 10^3


 


Lymphocytes # (Auto) 0.7 L 1.0-4.0  X 10^3


 


Monocytes # (Auto) 1.0  0.0-1.0  X 10^3


 


Eosinophils # (Auto)


 0.0 


 0.0-0.3


10^3/uL


 


Basophils # (Auto)


 0.0 


 0.0-0.1


10^3/uL


 


Neutrophils % (Manual) 80   %


 


Lymphocytes % (Manual) 9   %


 


Monocytes % (Manual) 8   %


 


Eosinophils % (Manual) 0   %


 


Basophils % (Manual) 0   %


 


Band Neutrophils 3   %


 


Blood Morphology Comment NORMAL   


 


Sodium Level 133 L 135-145  MMOL/L


 


Potassium Level 4.1  3.6-5.0  MMOL/L


 


Chloride Level 93 L   MMOL/L


 


Carbon Dioxide Level 30  21-32  MMOL/L


 


Anion Gap 10  5-14  MMOL/L


 


Blood Urea Nitrogen 14  7-18  MG/DL


 


Creatinine


 0.64 


 0.60-1.30


MG/DL


 


Estimat Glomerular Filtration


Rate > 60 


  





 


BUN/Creatinine Ratio 22   


 


Glucose Level 107 H   MG/DL


 


Calcium Level 9.8  8.5-10.1  MG/DL


 


Corrected Calcium 10.1  8.5-10.1  MG/DL


 


Total Bilirubin 0.4  0.1-1.0  MG/DL


 


Aspartate Amino Transf


(AST/SGOT) 18 


 5-34  U/L





 


Alanine Aminotransferase


(ALT/SGPT) 17 


 0-55  U/L





 


Alkaline Phosphatase 86    U/L


 


C-Reactive Protein High


Sensitivity 15.82 H


 0.00-0.50


MG/DL


 


Total Protein 7.1  6.4-8.2  GM/DL


 


Albumin 3.6  3.2-4.5  GM/DL








My Orders





Orders - CHRISTOPHE MOSS MD


Cbc With Automated Diff (3/30/20 09:43)


Comprehensive Metabolic Panel (3/30/20 09:43)


Hs C Reactive Protein (3/30/20 09:43)


Ed Iv/Invasive Line Start (3/30/20 09:43)


Lactated Ringers (Lr 1000 Ml Iv Solution (3/30/20 09:43)


Fentanyl  Injection (Sublimaze Injection (3/30/20 09:43)


Chest 1 View, Ap/Pa Only (3/30/20 09:44)


Manual Differential (3/30/20 09:45)


Morphine  Injection (Morphine  Injection (3/30/20 11:00)


Dexamethasone Injection (Decadron Inject (3/30/20 11:00)


Morphine  Injection (Morphine  Injection (3/30/20 11:45)





Medications Given in ED





Current Medications








 Medications  Dose


 Ordered  Sig/Zachary


 Route  Start Time


 Stop Time Status Last Admin


Dose Admin


 


 Dexamethasone


 Sodium Phosphate  10 mg  ONCE  ONCE


 IV  3/30/20 11:00


 3/30/20 11:01 DC 3/30/20 11:00


10 MG


 


 Lactated Ringer's  1,000 ml @ 


 0 mls/hr  Q0M ONCE


 IV  3/30/20 09:43


 3/30/20 09:45 DC 3/30/20 09:52


1,000 MLS/HR


 


 Morphine Sulfate  2 mg  ONCE  ONCE


 IVP  3/30/20 11:45


 3/30/20 11:46 DC 3/30/20 11:43


2 MG


 


 Morphine Sulfate  5 mg  ONCE  ONCE


 IVP  3/30/20 11:00


 3/30/20 11:01 DC 3/30/20 11:00


5 MG








Vital Signs/I&O











 3/30/20





 09:29


 


Temp 37.2


 


Pulse 95


 


Resp 18


 


B/P (MAP) 142/80 (100)


 


Pulse Ox 93


 


O2 Delivery Room Air





Capillary Refill :


Progress Note :  


Progress Note


Seen and evaluated. IV, labs, chest x-ray, LR 1 L bolus and fentanyl 50 g IV 

ordered. Monitor patient. 1140: I have discussed the case with Dr. Obrien 

regarding the patient's care. He agrees with additional morphine 5 mg IV and 

Decadron 10 mg IV that I gave earlier. Patient's pain is uncontrolled. She has 

stopped her chemotherapy and radiation therapy and consideration for palliative 

care hospice should be made. He will discuss this with her and will see her on 

Friday at 1 PM. We did discuss at length her concerns regarding pain control. 

She currently has fentanyl patch up to 50 g every 3 days. We will increase that

to 100 g every 3 days if needed. Patient was very concerned about her family 

knowing that she could be fed up to 5 ensures a day. I will put that in the 

discharge instructions. She has hydrocodone 7.5/325 tablets that she is unable 

to swallow. She has been using the concentrator morphine after her surgery. I 

would consider hydrocodone liquid but she has the other prescriptions. We will 

do the fentanyl adjustment instead. Patient worried about nursing home 

placement. While this is a possibility, I don't think she meets at this point 

and the nursing homes are blocked down due to current pandemic. This may open in

the future and can be considered but at a later date. Discharged home with 

return precautions. Patient verbalize understanding instructions and agreement 

with plan.





Departure


Impression





   Primary Impression:  


   Esophageal cancer


   Qualified Codes:  C15.9 - Malignant neoplasm of esophagus, unspecified


   Additional Impression:  


   Uncontrolled pain


Disposition:  01 HOME, SELF-CARE


Condition:  Stable





Departure-Patient Inst.


Decision time for Depature:  11:49


Referrals:  


PRATIK HERNANDES MD (PCP/Family)


Primary Care Physician


Patient Instructions:  Cancer Pain Syndromes (DC), Esophageal Cancer





Add. Discharge Instructions:  








All discharge instructions reviewed with patient and/or family. Voiced 

understanding.





You may have up to 5 Ensure doses daily through your G-tube. Flush with a small 

amount of fresh water afterwards to clear the tube (approximately 30 mL). 

Continue pain medications as previously prescribed but you may increase your 

fentanyl patch to 100 g every 3 days. You may start with a 50 g patch and if 

that is not working increase it to 100 g total by adding a second 50 g patch. 

Do not exceed 100 g total fentanyl dosing. You may remove the other patches 

that you currently have on when you get home and start with a new 50 g patch. 

If that is not working after a few hours, add the second patch. Follow up with 

Dr. Obrien on Friday, April 3 at 1 PM in the cancer center. If your pain is 

uncontrolled before then, call the cancer center for guidance to avoid exposure 

risk to the pandemic virus. Retained for markedly increased pain, weakness, 

vomiting, breathing problems or other concerns as needed.





Copy


Copies To 1:   MANDO OBRIEN


Copies To 2:   PRATIK HERNANDES MD, TIMOTHY D MD          Mar 30, 2020 09:48

## 2020-03-30 NOTE — DIAGNOSTIC IMAGING REPORT
INDICATION: Fall.



TIME OF EXAM: 10:10 AM



Correlation is made with prior chest from 01/31/2017.



FINDINGS: The heart size is normal. The pulmonary vascularity is

unremarkable. The lungs are clear. No infiltrate, effusion or

pneumothorax is detected.



IMPRESSION: No acute cardiopulmonary process is detected.



Dictated by: 



  Dictated on workstation # [id number]

## 2020-04-03 ENCOUNTER — HOSPITAL ENCOUNTER (OUTPATIENT)
Dept: HOSPITAL 75 - ONC | Age: 70
LOS: 6 days | Discharge: HOME | End: 2020-04-09
Attending: INTERNAL MEDICINE
Payer: MEDICARE

## 2020-04-03 VITALS — BODY MASS INDEX: 21.82 KG/M2 | WEIGHT: 139 LBS | HEIGHT: 67 IN

## 2020-04-03 DIAGNOSIS — I25.10: ICD-10-CM

## 2020-04-03 DIAGNOSIS — F17.210: ICD-10-CM

## 2020-04-03 DIAGNOSIS — I10: ICD-10-CM

## 2020-04-03 DIAGNOSIS — K21.9: ICD-10-CM

## 2020-04-03 DIAGNOSIS — Z79.899: ICD-10-CM

## 2020-04-03 DIAGNOSIS — Z79.02: ICD-10-CM

## 2020-04-03 DIAGNOSIS — Z95.5: ICD-10-CM

## 2020-04-03 DIAGNOSIS — I73.9: ICD-10-CM

## 2020-04-03 DIAGNOSIS — Z08: Primary | ICD-10-CM

## 2020-04-03 DIAGNOSIS — Z92.21: ICD-10-CM

## 2020-04-03 DIAGNOSIS — E78.2: ICD-10-CM

## 2020-04-03 DIAGNOSIS — K44.9: ICD-10-CM

## 2020-04-03 DIAGNOSIS — Z79.82: ICD-10-CM

## 2020-04-03 DIAGNOSIS — Z85.21: ICD-10-CM

## 2020-04-03 DIAGNOSIS — Z92.3: ICD-10-CM

## 2020-04-03 PROCEDURE — 77386: CPT

## 2020-04-03 PROCEDURE — 96375 TX/PRO/DX INJ NEW DRUG ADDON: CPT

## 2020-04-03 PROCEDURE — 87088 URINE BACTERIA CULTURE: CPT

## 2020-04-03 PROCEDURE — 77300 RADIATION THERAPY DOSE PLAN: CPT

## 2020-04-03 PROCEDURE — 96361 HYDRATE IV INFUSION ADD-ON: CPT

## 2020-04-03 PROCEDURE — 96367 TX/PROPH/DG ADDL SEQ IV INF: CPT

## 2020-04-03 PROCEDURE — 96374 THER/PROPH/DIAG INJ IV PUSH: CPT

## 2020-04-03 PROCEDURE — 96413 CHEMO IV INFUSION 1 HR: CPT

## 2020-04-03 PROCEDURE — 85025 COMPLETE CBC W/AUTO DIFF WBC: CPT

## 2020-04-03 PROCEDURE — 99204 OFFICE O/P NEW MOD 45 MIN: CPT

## 2020-04-03 PROCEDURE — 77334 RADIATION TREATMENT AID(S): CPT

## 2020-04-03 PROCEDURE — 99213 OFFICE O/P EST LOW 20 MIN: CPT

## 2020-04-03 PROCEDURE — 80053 COMPREHEN METABOLIC PANEL: CPT

## 2020-04-03 PROCEDURE — 87186 SC STD MICRODIL/AGAR DIL: CPT

## 2020-04-03 PROCEDURE — 87077 CULTURE AEROBIC IDENTIFY: CPT

## 2020-04-03 PROCEDURE — 77336 RADIATION PHYSICS CONSULT: CPT

## 2020-04-03 PROCEDURE — 36415 COLL VENOUS BLD VENIPUNCTURE: CPT

## 2020-04-03 PROCEDURE — 77338 DESIGN MLC DEVICE FOR IMRT: CPT

## 2020-04-03 PROCEDURE — 77301 RADIOTHERAPY DOSE PLAN IMRT: CPT

## 2020-04-03 PROCEDURE — 81000 URINALYSIS NONAUTO W/SCOPE: CPT

## 2020-04-03 PROCEDURE — 84443 ASSAY THYROID STIM HORMONE: CPT

## 2020-04-03 PROCEDURE — 77470 SPECIAL RADIATION TREATMENT: CPT

## 2020-04-03 PROCEDURE — 83735 ASSAY OF MAGNESIUM: CPT

## 2020-04-03 PROCEDURE — 80048 BASIC METABOLIC PNL TOTAL CA: CPT
